# Patient Record
Sex: MALE | NOT HISPANIC OR LATINO | Employment: STUDENT | URBAN - METROPOLITAN AREA
[De-identification: names, ages, dates, MRNs, and addresses within clinical notes are randomized per-mention and may not be internally consistent; named-entity substitution may affect disease eponyms.]

---
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
## 2021-01-02 ENCOUNTER — HOSPITAL ENCOUNTER (EMERGENCY)
Facility: CLINIC | Age: 21
Discharge: HOME | End: 2021-01-02
Attending: EMERGENCY MEDICINE | Admitting: EMERGENCY MEDICINE
Payer: COMMERCIAL

## 2021-01-02 VITALS
WEIGHT: 143 LBS | HEART RATE: 105 BPM | RESPIRATION RATE: 18 BRPM | DIASTOLIC BLOOD PRESSURE: 82 MMHG | TEMPERATURE: 99.5 F | SYSTOLIC BLOOD PRESSURE: 127 MMHG | OXYGEN SATURATION: 99 %

## 2021-01-02 DIAGNOSIS — J02.9 ACUTE PHARYNGITIS, UNSPECIFIED ETIOLOGY: ICD-10-CM

## 2021-01-02 DIAGNOSIS — J11.1 INFLUENZA-LIKE ILLNESS: ICD-10-CM

## 2021-01-02 DIAGNOSIS — R51.9 NONINTRACTABLE HEADACHE, UNSPECIFIED CHRONICITY PATTERN, UNSPECIFIED HEADACHE TYPE: ICD-10-CM

## 2021-01-02 LAB
FLUABV+SARS-COV-2+RSV PNL RESP NAA+PROBE: NEGATIVE
FLUABV+SARS-COV-2+RSV PNL RESP NAA+PROBE: NEGATIVE
LABORATORY COMMENT REPORT: NORMAL
RSV RNA SPEC QL NAA+PROBE: NORMAL
SARS-COV-2 RNA SPEC QL NAA+PROBE: NEGATIVE
SPECIMEN SOURCE: NORMAL

## 2021-01-02 PROCEDURE — C9803 HOPD COVID-19 SPEC COLLECT: HCPCS

## 2021-01-02 PROCEDURE — 250N000009 HC RX 250: Performed by: EMERGENCY MEDICINE

## 2021-01-02 PROCEDURE — 250N000013 HC RX MED GY IP 250 OP 250 PS 637: Performed by: EMERGENCY MEDICINE

## 2021-01-02 PROCEDURE — 99283 EMERGENCY DEPT VISIT LOW MDM: CPT

## 2021-01-02 PROCEDURE — 87636 SARSCOV2 & INF A&B AMP PRB: CPT | Performed by: EMERGENCY MEDICINE

## 2021-01-02 RX ORDER — IBUPROFEN 600 MG/1
600 TABLET, FILM COATED ORAL ONCE
Status: COMPLETED | OUTPATIENT
Start: 2021-01-02 | End: 2021-01-02

## 2021-01-02 RX ORDER — DEXAMETHASONE SODIUM PHOSPHATE 10 MG/ML
10 INJECTION, SOLUTION INTRAMUSCULAR; INTRAVENOUS ONCE
Status: COMPLETED | OUTPATIENT
Start: 2021-01-02 | End: 2021-01-02

## 2021-01-02 RX ORDER — ACETAMINOPHEN 325 MG/1
650 TABLET ORAL ONCE
Status: COMPLETED | OUTPATIENT
Start: 2021-01-02 | End: 2021-01-02

## 2021-01-02 RX ADMIN — IBUPROFEN 600 MG: 600 TABLET, FILM COATED ORAL at 01:20

## 2021-01-02 RX ADMIN — DEXAMETHASONE SODIUM PHOSPHATE 10 MG: 10 INJECTION, SOLUTION INTRAMUSCULAR; INTRAVENOUS at 01:20

## 2021-01-02 RX ADMIN — ACETAMINOPHEN 650 MG: 325 TABLET, FILM COATED ORAL at 01:20

## 2021-01-02 ASSESSMENT — ENCOUNTER SYMPTOMS
VOMITING: 0
HEADACHES: 1
ABDOMINAL PAIN: 0
NUMBNESS: 0
TROUBLE SWALLOWING: 0
NAUSEA: 0
CHILLS: 1
WEAKNESS: 0
NECK STIFFNESS: 0
SORE THROAT: 1
SHORTNESS OF BREATH: 0
COUGH: 1
DIARRHEA: 0

## 2021-01-02 NOTE — DISCHARGE INSTRUCTIONS
Discharge Instructions  COVID-19    COVID-19 is the disease caused by a new coronavirus. The virus spreads from person-to-person primarily by droplets when an infected person coughs or sneezes and the droplet either lands on another person or that other person touches a surface with the droplet on it. There are tests available to diagnose COVID-19. There is no specific treatment or medicine for the disease.    You may have been diagnosed with COVID, may be being tested for COVID and have a pending test result, or may have been exposed to COVID.    Symptoms of COVID-19    Many people have no symptoms or mild symptoms.  Symptoms may usually appear 4 to 5 days (up to 14 days) after contact with a person with COVID-19. Some people will get severe symptoms and pneumonia. Usual symptoms are:     ? Fever  ? Cough  ? Trouble breathing    Less common symptoms are: Headache, body aches, sore throat, sneezing, diarrhea, loss of taste or smell.    Isolation and Quarantine    You were seen because you have symptoms, had an exposure, or had some other concern about possible COVID. The best way to stop the spread of the virus is to avoid contact with others.  Isolation refers to sick people staying away from people who are not sick. A person in quarantine is limiting activity because they were exposed and are waiting to see if they might become sick.    If you test positive for COVID, you should stay home (isolation) for at least 10 days after your symptoms began, and for 24 hours with no fever and improvement of symptoms--whichever is longer. (Your fever should be gone for 24 hours without using fever-reducing medicine). If you have no symptoms, you should stay home (isolation) for 10 days from the day of the test.    For example, if you have a fever and cough for 6 days, you need to stay home 4 more days with no fever for a total of 10 days. Or, if you have a fever and cough for 10 days, you need to stay home one more day with  no fever for a total of 11 days.    If you have a high-risk exposure to COVID (you spent 15 minutes or more within six feet of somebody who has COVID), you should stay home (quarantine) for 14 days. Even if you test negative for COVID, the CDC recommends a 14-day quarantine from the time of your last exposure to that individual. There are options for a shortened (<14 day quarantine) you can review at:    [email or web address]/close.html#long    If you have symptoms but a negative test, you should stay at home until you are symptom-free and without fever for 24 hours, using the same judgment you would for when it is safe to return to work/school from strep throat, influenza, or the common cold. If you worsen, you should consider being re-evaluated.    If you are being tested for COVID and your test is pending, you should stay home until you know your test result.    How should I protect myself and others?    Do not go to work or school. Have a friend or relative do your shopping. Do not use public transportation (bus, train) or ridesharing (Lyft, Uber).    Separate yourself from other people in your home. As much as possible, you should stay in one room and away from other people in your home. Also, use a separate bathroom, if possible. Avoid handling pets or other animals while sick.     Wear a facemask if you need to be around other people and cover your mouth and nose with a tissue when you cough or sneeze.     Avoid sharing personal household items. You should not share dishes, drinking glasses, forks/knives/spoons, towels, or bedding with other people in your home. After using these items, they should be washed with soap and water. Clean parts of your home that are touched often (doorknobs, faucets, countertops, etc.) daily.     Wash your hands often with soap and water for at least 20 seconds or use an alcohol-based hand  containing at least 60% alcohol.     Avoid touching  your face.    Treat your symptoms. You can take Acetaminophen (Tylenol) to treat body aches and fever as needed for comfort. Ibuprofen (Advil or Motrin) can be used as well if you still have symptoms after taking Tylenol. Drink fluids. Rest.    Watch for worsening symptoms such as shortness of breath/difficulty breathing or very severe weakness.    Employers/workplaces are being asked by the Centers for Disease Control (CDC) to not request notes/documentation for you to return to work or prove that you were ill. You may choose to show your employer this paperwork. Also, repeat testing should not be required to return to work.    Return to the Emergency Department if:    If you are developing worsening breathing, shortness of breath, or feel worse you should seek medical attention.  If you are uncertain, contact your health care provider/clinic. If you need emergency medical attention, call 911 and tell them you have been ill.

## 2021-01-02 NOTE — LETTER
January 2, 2021      To Whom It May Concern:      Sudarshan Izaguirre was seen in our Emergency Department today, 01/02/21.  I expect his condition to improve over the next few days.  He may return to work Monday, 1/4/2020.         Sincerely,        Deisy Yen RN

## 2021-01-02 NOTE — ED TRIAGE NOTES
"Patient is reporting \"all the symptoms of the flu\". He recently returned from a trip to [place].   "

## 2021-01-02 NOTE — ED PROVIDER NOTES
History   Chief Complaint:  Suspected Covid     HPI   Sudarshan Izaguirre is a 20 year old male who is otherwise healthy presents to the ER for headache, sore throat, chills, nasal congestion, rare dry cough.  Patient returned from a trip to [place] this afternoon.  Sore throat started this morning.  He is still able to swallow.  He denies neck stiffness.  He denies vision changes or numbness or weakness in extremities or nausea or vomiting or diarrhea or abdominal pain.  He denies chest pain or shortness of breath.  He has not taken any medicines for his symptoms.    Review of Systems   Constitutional: Positive for chills.   HENT: Positive for congestion and sore throat. Negative for trouble swallowing.    Eyes: Negative for visual disturbance.   Respiratory: Positive for cough. Negative for shortness of breath.    Cardiovascular: Negative for chest pain.   Gastrointestinal: Negative for abdominal pain, diarrhea, nausea and vomiting.   Musculoskeletal: Negative for neck stiffness.   Neurological: Positive for headaches. Negative for weakness and numbness.   All other systems reviewed and are negative.    Allergies:  No Known Allergies    Medications:  None    Past Medical History:    Asthma    Social History:  Presents alone  Recently returned from [place]    Physical Exam     Patient Vitals for the past 24 hrs:   BP Temp Pulse Resp SpO2 Weight   01/02/21 0046 127/82 99.5  F (37.5  C) 105 18 99 % 64.9 kg (143 lb)       Physical Exam  VS: Reviewed per above  HENT: Mucous membranes moist. Uvula midline, no tonsillar hypertrophy nor asymmetry. Tolerating secretions, normal phonation. No nuchal rigidity.  EYES: sclera anicteric  CV: Rate as noted, regular rhythm.   RESP: Effort normal. Breath sounds are normal bilaterally.  NEURO: Alert, moving all extremities equally  MSK: No deformity of the extremities  SKIN: Warm and dry      Emergency Department Course     Laboratory:  Symptomatic COVID-19 Virus (Coronavirus) by  PCR: Pending    Emergency Department Course:    Reviewed:  I reviewed nursing notes, vitals, past medical history and care everywhere    Assessments:  0049: I obtained history from the patient and performed a physical exam. I discussed findings and plan at this time.     Interventions:  0120: Decadron 10 mg PO   0120: Tylenol 650 mg PO   0120: Ibuprofen 600 mg PO      Disposition:  The patient was discharged to home.     Impression & Plan   Covid-19  Sudarshan Izaguirre was evaluated during a global COVID-19 pandemic, which necessitated consideration that the patient might be at risk for infection with the SARS-CoV-2 virus that causes COVID-19.   Applicable protocols for evaluation were followed during the patient's care.   COVID-19 was considered as part of the patient's evaluation. The plan for testing is:  a test was obtained during this visit.    Medical Decision Making:  Sudarshan Izaguirre is a 20 year old male who presents for evaluation of headache, chills, sore throat, nasal congestion, rare dry cough.  Vitals signs reveal normal SPO2 on room air, heart rate of 105, stable blood pressure, temperature of 99.5  F. Constellation of sx is consistent with an influenza like illness vs URI but upon initial evaluation, there is also concern for possible SARS-CoV2 infection. COVID19 testing was sent.  At this point in time, patient is well appearing and there are no historical or exam findings that merit hospital admission or further workup.  I have low suspicion for processes such as meningitis, peritonsillar abscess, retropharyngeal abscess, epiglottitis, pneumonia.  He was given a dose of Decadron here for his pharyngitis and Tylenol ibuprofen for his headache.  I encouraged tylenol/ibuprofen at home and lots of fluids and rest. Discussed self isolation. Discussed return precautions including shortness of breath, neck stiffness, trouble swallowing, blood in sputum, unable to tolerate liquids or new concerns.  Encouraged follow up with in primary care clinic as they are able as well.        Diagnosis:    ICD-10-CM    1. Influenza-like illness  J11.1    2. Nonintractable headache, unspecified chronicity pattern, unspecified headache type  R51.9    3. Acute pharyngitis, unspecified etiology  J02.9        Discharge Medications:  New Prescriptions    No medications on file       Scribe Disclosure:  I, Aiyana Laurent, am serving as a scribe at 12:44 AM on 1/2/2021 to document services personally performed by Hira Leone MD based on my observations and the provider's statements to me.         Hira Leone MD  01/02/21 0151

## 2021-01-02 NOTE — ED AVS SNAPSHOT
[facility] Emergency Dept  [address] [place] [facility] [address]  Phone: [phone]  Fax: [phone]                                    Sudarshan Izaguirre   MRN: [id number]    Department: [facility] Emergency Dept   Date of Visit: 1/2/2021           After Visit Summary Signature Page    I have received my discharge instructions, and my questions have been answered. I have discussed any challenges I see with this plan with the nurse or doctor.    ..........................................................................................................................................  Patient/Patient Representative Signature      ..........................................................................................................................................  Patient Representative Print Name and Relationship to Patient    ..................................................               ................................................  Date                                   Time    ..........................................................................................................................................  Reviewed by Signature/Title    ...................................................              ..............................................  Date                                               Time          22EPIC Rev 08/18

## 2021-01-03 ENCOUNTER — TELEPHONE (OUTPATIENT)
Dept: EMERGENCY MEDICINE | Facility: CLINIC | Age: 21
End: 2021-01-03

## 2021-01-03 NOTE — TELEPHONE ENCOUNTER

## 2021-05-18 ENCOUNTER — HOSPITAL ENCOUNTER (EMERGENCY)
Facility: CLINIC | Age: 21
End: 2021-05-18
Payer: COMMERCIAL

## 2022-09-22 ENCOUNTER — HOSPITAL ENCOUNTER (EMERGENCY)
Facility: CLINIC | Age: 22
Discharge: HOME | End: 2022-09-22
Attending: EMERGENCY MEDICINE | Admitting: EMERGENCY MEDICINE
Payer: COMMERCIAL

## 2022-09-22 VITALS
DIASTOLIC BLOOD PRESSURE: 60 MMHG | TEMPERATURE: 98.3 F | BODY MASS INDEX: 25.18 KG/M2 | OXYGEN SATURATION: 100 % | HEART RATE: 73 BPM | RESPIRATION RATE: 20 BRPM | WEIGHT: 170 LBS | HEIGHT: 69 IN | SYSTOLIC BLOOD PRESSURE: 120 MMHG

## 2022-09-22 DIAGNOSIS — R35.0 URINARY FREQUENCY: ICD-10-CM

## 2022-09-22 DIAGNOSIS — J02.0 STREPTOCOCCAL PHARYNGITIS: ICD-10-CM

## 2022-09-22 LAB
ALBUMIN UR-MCNC: NEGATIVE MG/DL
APPEARANCE UR: CLEAR
BILIRUB UR QL STRIP: NEGATIVE
COLOR UR AUTO: NORMAL
DEPRECATED S PYO AG THROAT QL EIA: POSITIVE
FLUAV RNA SPEC QL NAA+PROBE: NEGATIVE
FLUBV RNA RESP QL NAA+PROBE: NEGATIVE
GLUCOSE BLDC GLUCOMTR-MCNC: 104 MG/DL (ref 70–99)
GLUCOSE UR STRIP-MCNC: NEGATIVE MG/DL
HGB UR QL STRIP: NEGATIVE
KETONES UR STRIP-MCNC: NEGATIVE MG/DL
LEUKOCYTE ESTERASE UR QL STRIP: NEGATIVE
NITRATE UR QL: NEGATIVE
PH UR STRIP: 5.5 [PH] (ref 5–7)
RBC URINE: 1 /HPF
RSV RNA SPEC NAA+PROBE: NEGATIVE
SARS-COV-2 RNA RESP QL NAA+PROBE: NEGATIVE
SP GR UR STRIP: 1.02 (ref 1–1.03)
SQUAMOUS EPITHELIAL: <1 /HPF
UROBILINOGEN UR STRIP-MCNC: NORMAL MG/DL
WBC URINE: <1 /HPF

## 2022-09-22 PROCEDURE — C9803 HOPD COVID-19 SPEC COLLECT: HCPCS

## 2022-09-22 PROCEDURE — 99283 EMERGENCY DEPT VISIT LOW MDM: CPT | Mod: CS

## 2022-09-22 PROCEDURE — 87880 STREP A ASSAY W/OPTIC: CPT | Performed by: EMERGENCY MEDICINE

## 2022-09-22 PROCEDURE — 87637 SARSCOV2&INF A&B&RSV AMP PRB: CPT | Performed by: EMERGENCY MEDICINE

## 2022-09-22 PROCEDURE — 81001 URINALYSIS AUTO W/SCOPE: CPT | Performed by: EMERGENCY MEDICINE

## 2022-09-22 RX ORDER — AMOXICILLIN 500 MG/1
500 CAPSULE ORAL 2 TIMES DAILY
Qty: 20 CAPSULE | Refills: 0 | Status: SHIPPED | OUTPATIENT
Start: 2022-09-22 | End: 2022-10-02

## 2022-09-22 RX ORDER — OXYMETAZOLINE HYDROCHLORIDE 0.05 G/100ML
2 SPRAY NASAL 2 TIMES DAILY
Qty: 1 ML | Refills: 0 | Status: SHIPPED | OUTPATIENT
Start: 2022-09-22 | End: 2022-09-25

## 2022-09-22 RX ORDER — FLUTICASONE PROPIONATE 50 MCG
1 SPRAY, SUSPENSION (ML) NASAL DAILY
Qty: 9.9 ML | Refills: 0 | Status: SHIPPED | OUTPATIENT
Start: 2022-09-22

## 2022-09-22 ASSESSMENT — ACTIVITIES OF DAILY LIVING (ADL): ADLS_ACUITY_SCORE: 33

## 2022-09-22 NOTE — ED PROVIDER NOTES
"  History     Chief Complaint:  Pharyngitis       HPI   Sudarshan Izaguirre is a 22 year old male who presents with sore throat.  Sore throat began yesterday.  He has painful swallowing.  He also has a lot of nasal congestion.  No fevers or cough.  He has had a headache.  He feels fatigued.  He had a difficult time sleeping secondary to throat pain.  He has had strep in the past.  He has some nausea but no vomiting.  No diarrhea.  Also has noted that has been urinating frequently without pain.  He has no concerns about sexually transmitted infections..    ROS:  Review of Systems   All other systems reviewed and are negative.      Allergies:  No Known Allergies     Medications:    None    Past Medical History:    No past medical history on file.    Past Surgical History:    No past surgical history on file.     Family History:    family history is not on file.    Social History:   reports that he has never smoked. He has never used smokeless tobacco.  PCP: Rickie, [facility]     Physical Exam   Patient Vitals for the past 24 hrs:   BP Temp Temp src Pulse Resp SpO2 Height Weight   09/22/22 0423 120/60 98.3  F (36.8  C) Oral 73 20 100 % 1.753 m (5' 9\") 77.1 kg (170 lb)        Physical Exam  Eyes:  The pupils are equal and round    Conjunctivae and sclerae are normal  ENT:    The nose is normal    Pinnae are normal    The oropharynx has mild posterior erythema without evidence of PTA  Neck:  Normal range of motion    There is no rigidity noted    Trachea is in the midline  CV:  Regular rate and rhythm     No edema  Resp:  Normal respiratory effort. Speaks in full sentences   MS:  Normal muscular tone    No asymmetric leg swelling  Skin:  No rash or acute skin lesions noted  Neuro:   Awake, alert.      Speech is normal and fluent.    Face is symmetric.     Moves all extremities  Lymph: No adenopathy in neck    Emergency Department Course     Laboratory:  Labs Ordered and Resulted from Time of ED Arrival to " Time of ED Departure   STREPTOCOCCUS A RAPID SCREEN W REFELX TO PCR - Abnormal       Result Value    Group A Strep antigen Positive (*)    INFLUENZA A/B & SARS-COV2 PCR MULTIPLEX - Normal    Influenza A PCR Negative      Influenza B PCR Negative      RSV PCR Negative      SARS CoV2 PCR Negative     ROUTINE UA WITH MICROSCOPIC REFLEX TO CULTURE        Emergency Department Course:    Reviewed:  I reviewed nursing notes and vitals    Interventions:  Medications - No data to display     Disposition:  The patient was discharged to home.     Impression & Plan      Medical Decision Making:  Sudarshan Izaguirre is a 22 year old male who presents to the emergency department with a sore throat.  Rapid strep is positive.  COVID and flu testing are negative.  No signs of peritonsillar abscess.  She was also having urinary frequency.  No adenopathy in the neck.  We will plan for amoxicillin.  Is also having a lot of nasal congestion and she is given prescription for Flonase to Afrin.  Also notes urinary frequency without any pain.  Urinalysis reveals no signs of infection, proteinuria, or glucosuria.  Blood sugar was normal..  Plan for follow-up with PCP as needed.  Return with any new or worrisome symptoms.  Work note provided.      Diagnosis:    ICD-10-CM    1. Streptococcal pharyngitis  J02.0    2. Urinary frequency  R35.0         Discharge Medications:  New Prescriptions    AMOXICILLIN (AMOXIL) 500 MG CAPSULE    Take 1 capsule (500 mg) by mouth 2 times daily for 10 days    FLUTICASONE (FLONASE) 50 MCG/ACT NASAL SPRAY    Spray 1 spray into both nostrils daily    OXYMETAZOLINE (AFRIN NASAL SPRAY) 0.05 % NASAL SPRAY    Spray 2 sprays in nostril 2 times daily for 3 days        9/22/2022   Hansel Ugarte MD [place], Aaron Joseph, MD  09/22/22 5552

## 2022-09-22 NOTE — LETTER
September 22, 2022      To Whom It May Concern:      Sudarshan Izaguirre was seen in our Emergency Department today, 09/22/22.  I expect his condition to improve over the next 1-2 days.  He may return to work on or about 9/23/2022.    Sincerely,        Hasnel Ugarte MD

## 2022-09-22 NOTE — ED TRIAGE NOTES
Patient here with a sore throat and headache since yesterday. He denies having a cough and no fever

## 2022-11-21 ENCOUNTER — HOSPITAL ENCOUNTER (EMERGENCY)
Facility: CLINIC | Age: 22
Discharge: HOME | End: 2022-11-21
Attending: EMERGENCY MEDICINE | Admitting: EMERGENCY MEDICINE
Payer: COMMERCIAL

## 2022-11-21 VITALS
HEART RATE: 86 BPM | WEIGHT: 175 LBS | DIASTOLIC BLOOD PRESSURE: 80 MMHG | HEIGHT: 69 IN | RESPIRATION RATE: 20 BRPM | SYSTOLIC BLOOD PRESSURE: 130 MMHG | TEMPERATURE: 98.2 F | OXYGEN SATURATION: 99 % | BODY MASS INDEX: 25.92 KG/M2

## 2022-11-21 DIAGNOSIS — J02.9 ACUTE PHARYNGITIS, UNSPECIFIED ETIOLOGY: ICD-10-CM

## 2022-11-21 LAB — DEPRECATED S PYO AG THROAT QL EIA: NEGATIVE

## 2022-11-21 PROCEDURE — 96374 THER/PROPH/DIAG INJ IV PUSH: CPT

## 2022-11-21 PROCEDURE — 87651 STREP A DNA AMP PROBE: CPT | Performed by: EMERGENCY MEDICINE

## 2022-11-21 PROCEDURE — 250N000011 HC RX IP 250 OP 636: Performed by: EMERGENCY MEDICINE

## 2022-11-21 PROCEDURE — 99284 EMERGENCY DEPT VISIT MOD MDM: CPT | Mod: 25

## 2022-11-21 RX ORDER — DEXAMETHASONE SODIUM PHOSPHATE 4 MG/ML
10 INJECTION, SOLUTION INTRA-ARTICULAR; INTRALESIONAL; INTRAMUSCULAR; INTRAVENOUS; SOFT TISSUE ONCE
Status: COMPLETED | OUTPATIENT
Start: 2022-11-21 | End: 2022-11-21

## 2022-11-21 RX ADMIN — DEXAMETHASONE SODIUM PHOSPHATE 10 MG: 4 INJECTION, SOLUTION INTRAMUSCULAR; INTRAVENOUS at 23:26

## 2022-11-21 ASSESSMENT — ENCOUNTER SYMPTOMS
ABDOMINAL PAIN: 0
FEVER: 0
SORE THROAT: 1
RHINORRHEA: 1
VOMITING: 0
NAUSEA: 0

## 2022-11-22 LAB — GROUP A STREP BY PCR: NOT DETECTED

## 2022-11-22 NOTE — ED PROVIDER NOTES
"    History   Chief Complaint:  Pharyngitis       HPI   Sudarshan Izaguirre is a 22 year old male who presents with a sore throat. He states he had onset of a sore throat starting today at 1900. The patient reports that he has also had some rhinorrhea. He denies any fever, adominal pain, nausea, and vomiting.  He is concerned for strep pharyngitis.    Review of Systems   Constitutional: Negative for fever.   HENT: Positive for rhinorrhea and sore throat.    Gastrointestinal: Negative for abdominal pain, nausea and vomiting.   All other systems reviewed and are negative.      Allergies:  No Known Drug Allergies    Medications:    Medications reviewed. No current medications.     Past Medical History:    Medical history reviewed. No pertinent medical history.    Past Surgical History:    Surgical history reviewed. No pertinent surgical history.    Family History:    Family history reviewed. No pertinent family history.      Social History:  The patient presents to the ED alone.   PCP: [facility], [facility]     Physical Exam     Patient Vitals for the past 24 hrs:   BP Temp Temp src Pulse Resp SpO2 Height Weight   11/21/22 2250 130/80 98.2  F (36.8  C) Oral 86 20 99 % 1.753 m (5' 9\") 79.4 kg (175 lb)       Physical Exam  Nursing note and vitals reviewed.  Constitutional:  Oriented to person, place, and time. Cooperative.   HENT:   Nose:    Nose normal.   Mouth/Throat:   Erythema to the posterior oropharynx but no exudate or asymmetry.   Eyes:    Conjunctivae normal and EOM are normal.      Pupils are equal, round, and reactive to light.   Neck:    Trachea normal.   Cardiovascular:  Normal rate, regular rhythm, normal heart sounds and normal pulses. No murmur heard.  Pulmonary/Chest:  Effort normal and breath sounds normal.   Abdominal:   Soft. Normal appearance and bowel sounds are normal.      There is no tenderness.      There is no rebound and no CVA tenderness.   Musculoskeletal:  Extremities atraumatic " x 4.   Lymphadenopathy:  No cervical adenopathy.   Neurological:   Alert and oriented to person, place, and time. Normal strength.      No cranial nerve deficit or sensory deficit. GCS eye subscore is 4. GCS verbal subscore is 5. GCS motor subscore is 6.   Skin:    Skin is intact. No rash noted.   Psychiatric:   Normal mood and affect.    Emergency Department Course     Laboratory:  Labs Ordered and Resulted from Time of ED Arrival to Time of ED Departure   STREPTOCOCCUS A RAPID SCREEN W REFELX TO PCR - Normal       Result Value    Group A Strep antigen Negative     GROUP A STREPTOCOCCUS PCR THROAT SWAB            Emergency Department Course:    Reviewed:  I reviewed nursing notes, vitals and past medical history    Assessments:  2249 I obtained history and examined the patient as noted above.      I rechecked the patient and explained findings.     Disposition:  The patient was discharged to home.     Impression & Plan     Medical Decision Making:  This is a 22-year-old male came in for further evaluation of a sore throat, which started a few hours prior to arrival.  He really has no other symptoms to suggest influenza or COVID.  His main concern was strep pharyngitis, and his strep test is negative.  Here they are culturing this as well and that someone will get in touch with him if it comes back positive for strep.  He was provided an oral dose of dexamethasone here.  I suspect this is likely something viral, and I recommended rest as well as Tylenol and ibuprofen and fluids.  He should return with any concerns or worsening symptoms and follow-up if he is not improving in a timely fashion.      Diagnosis:    ICD-10-CM    1. Acute pharyngitis, unspecified etiology  J02.9           Discharge Medications:  New Prescriptions    No medications on file       Scribe Disclosure:  I, Adore Nuñez, am serving as a scribe at 10:53 PM on 11/21/2022 to document services personally performed by Joaquin Vaughan MD based on  my observations and the provider's statements to me.               Joaquin Vaughan MD  11/21/22 4244

## 2022-11-22 NOTE — ED TRIAGE NOTES
Patient here with a sore throat which started today. He denies having a cough and no fever     Triage Assessment     Row Name 11/21/22 1734       Triage Assessment (Adult)    Airway WDL WDL       Respiratory WDL    Respiratory WDL WDL       Skin Circulation/Temperature WDL    Skin Circulation/Temperature WDL WDL       Cardiac WDL    Cardiac WDL WDL       Peripheral/Neurovascular WDL    Peripheral Neurovascular WDL WDL       Cognitive/Neuro/Behavioral WDL    Cognitive/Neuro/Behavioral WDL WDL

## 2024-05-18 ENCOUNTER — ANCILLARY PROCEDURE (OUTPATIENT)
Dept: GENERAL RADIOLOGY | Facility: CLINIC | Age: 24
End: 2024-05-18
Attending: FAMILY MEDICINE
Payer: COMMERCIAL

## 2024-05-18 ENCOUNTER — OFFICE VISIT (OUTPATIENT)
Dept: FAMILY MEDICINE | Facility: CLINIC | Age: 24
End: 2024-05-18
Payer: COMMERCIAL

## 2024-05-18 VITALS
HEART RATE: 89 BPM | WEIGHT: 164 LBS | SYSTOLIC BLOOD PRESSURE: 117 MMHG | DIASTOLIC BLOOD PRESSURE: 54 MMHG | TEMPERATURE: 97.9 F | OXYGEN SATURATION: 99 % | BODY MASS INDEX: 24.22 KG/M2

## 2024-05-18 DIAGNOSIS — G89.29 CHRONIC PAIN OF LEFT KNEE: Primary | ICD-10-CM

## 2024-05-18 DIAGNOSIS — G89.29 CHRONIC PAIN OF LEFT KNEE: ICD-10-CM

## 2024-05-18 DIAGNOSIS — M25.562 CHRONIC PAIN OF LEFT KNEE: Primary | ICD-10-CM

## 2024-05-18 DIAGNOSIS — M25.562 CHRONIC PAIN OF LEFT KNEE: ICD-10-CM

## 2024-05-18 PROCEDURE — 99203 OFFICE O/P NEW LOW 30 MIN: CPT

## 2024-05-18 PROCEDURE — 73562 X-RAY EXAM OF KNEE 3: CPT | Mod: TC | Performed by: RADIOLOGY

## 2024-05-18 RX ORDER — LEVOTHYROXINE SODIUM 137 UG/1
TABLET ORAL
COMMUNITY
Start: 2024-02-20

## 2024-05-18 NOTE — PROGRESS NOTES
Assessment & Plan     Chronic pain of left knee  Xray wnl  2 month of knee pain after a fall onto knee  Refer to sports medicine for dx/treatment.  - XR Knee Left 3 Views             No follow-ups on file.    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  Ice and accupuncture.  Able to lift weights and do leg exercise        Review of Systems        Objective    There were no vitals taken for this visit.  Physical Exam  Vitals and nursing note reviewed.   Constitutional:       Appearance: Normal appearance.   Musculoskeletal:         General: Tenderness present. Normal range of motion.      Comments: Anterior knee with tenderness to palpation over the prox tibia area.  No effusion  ROM wnl  Low ext strength/sensation intact   Neurological:      Mental Status: He is alert.

## 2024-05-18 NOTE — PATIENT INSTRUCTIONS
I will refer you to sports medicine for further evaluation and treatment. Xray today looks okay. They should get you in very soon. I don't see any changes like seen in [facility].  In meantime ice 20-30 minutes 2-3x/day.     You could try some ibuprofen 400mg before bed if interfering with sleep.

## 2024-06-16 ENCOUNTER — HEALTH MAINTENANCE LETTER (OUTPATIENT)
Age: 24
End: 2024-06-16

## 2024-07-17 NOTE — PROGRESS NOTES
CHIEF COMPLAINT:  No chief complaint on file.       HISTORY OF PRESENT ILLNESS  Mr. Izaguirre is a pleasant 23 year old year old male who presents to clinic today with left knee pain.  Sudarshan explains that in March, he fell and hit his knee on a metal bar.  He states he hasn't been taking care of it much, hasn't been resting and will occasionally bump it.  X-ray on 5/18, and pain has gotten worse since then.    He has been able to perform most exercises on the leg today, however he does have pain with more dynamic exercises such as squat jumps, side lunges.    Onset: sudden  Location: left knee anterior  Quality:  sharp  Duration: 1 years   Severity: 8/10 at worst  Timing:intermittent episodes with increased activity  Modifying factors:  resting and non-use makes it better, movement and use makes it worse, having knee flexed for long periods  Associated signs & symptoms: pain and tenderness  Previous similar pain: No  Treatments to date: ice, NSAIDS    Additional history: as documented    Review of Systems: A 10-point review of systems was obtained and is negative except for as noted in the HPI.     MEDICAL HISTORY  There is no problem list on file for this patient.      Current Outpatient Medications   Medication Sig Dispense Refill    fluticasone (FLONASE) 50 MCG/ACT nasal spray Spray 1 spray into both nostrils daily (Patient not taking: Reported on 5/18/2024) 9.9 mL 0    levothyroxine (SYNTHROID/LEVOTHROID) 137 MCG tablet          No Known Allergies    No family history on file.    Additional medical/Social/Surgical histories reviewed in [facility] and updated as appropriate.    PHYSICAL EXAM  There were no vitals taken for this visit.    General  - normal appearance, in no obvious distress  Musculoskeletal - left knee  - stance: normal gait without limp  - inspection: no swelling or effusion  - palpation: no joint line tenderness, normal popliteal pulse, tender through entirety of the patellar tendon, tender prepatellar  bursal region.  - ROM: 135 degrees flexion, -5 degrees extension, painful passive flexion terminally  - strength: 5/5 in flexion, 5/5 in extension  - special tests:  (-) Lachman  (-) anterior drawer  (-) Justin  (-) Thessaly  (-) varus at 0 and 30 degrees flexion  (-) valgus at 0 and 30 degrees flexion  Neuro  - no sensory or motor deficit, grossly normal coordination, normal muscle tone      IMAGING : XR left knee 3 views. Final results and radiologist's interpretation, available in the [facility] health record. Images were reviewed with the patient/family members in the office today. My personal interpretation of the performed imaging is no acute osseous abnormality or significant degenerative changes of joint.     ASSESSMENT & PLAN  Mr. Izaguirre is a 23 year old year old male who presents to clinic today with chronic left anterior knee pain that seem to begin around an injury last year striking his knee on a metal bar while at a concert venue.    Clinical examination as well as reassuring x-rays today consistent with patellar tendinitis of left knee.  I do believe there is also a component of prepatellar bursitis that is chronic.    Diagnosis: Patellar tendinitis of left knee, chronic pain of left knee    I recommended starting patellar tendon home exercise program.  Discussed a patellar Cho-Pat strap or knee sleeve.  We did fit him for a neoprene sleeve today as he preferred this.  Physical therapy recommended given chronicity and I would like to see him back in 6 to 8 weeks.  Avoid provocative activities in the gym.  If no improvement after this time, I would then recommend considering MRI of his left knee.    It was a pleasure seeing Sudarshan today.    Angus Botello DO, [facility]  Primary Care Sports Medicine

## 2024-07-18 ENCOUNTER — OFFICE VISIT (OUTPATIENT)
Dept: ORTHOPEDICS | Facility: CLINIC | Age: 24
End: 2024-07-18
Attending: FAMILY MEDICINE
Payer: COMMERCIAL

## 2024-07-18 VITALS
HEIGHT: 69 IN | WEIGHT: 164 LBS | SYSTOLIC BLOOD PRESSURE: 112 MMHG | DIASTOLIC BLOOD PRESSURE: 72 MMHG | BODY MASS INDEX: 24.29 KG/M2

## 2024-07-18 DIAGNOSIS — M76.52 PATELLAR TENDINITIS, LEFT KNEE: Primary | ICD-10-CM

## 2024-07-18 DIAGNOSIS — M25.562 CHRONIC PAIN OF LEFT KNEE: ICD-10-CM

## 2024-07-18 DIAGNOSIS — G89.29 CHRONIC PAIN OF LEFT KNEE: ICD-10-CM

## 2024-07-18 PROCEDURE — 99203 OFFICE O/P NEW LOW 30 MIN: CPT | Performed by: FAMILY MEDICINE

## 2024-07-18 NOTE — PATIENT INSTRUCTIONS
Thank you for choosing [facility]    DR MENA'S CLINIC LOCATIONS  [place] [address] [address] [facility], 4th [place]   [place], [place], [address] [place] [address]   [phone]       APPOINTMENTS: [phone]    CARE QUESTIONS: [phone],    BILLING QUESTIONS: [phone]    FAX NUMBER: [phone]            1. Chronic pain of left knee          Physical Therapy orders have been placed with [facility] ([facility])  You can call [phone] to schedule at your convenience.            Patellar sleeve  PT  Voltaren gel  Home exercises.      Patellar Tendonitis    WHAT IS JUMPER S KNEE?    Jumper s knee is a problem with the tendon that connects your kneecap to your shinbone. Tendons are strong bands of tissue that connect muscle to bone. They can be injured suddenly or they may be slowly damaged over time. You can have tiny or partial tears in your tendon. If you have a complete tear of your tendon, it is called a rupture. Other tendon injuries may be called a strain, tendinosis, or tendonitis. Jumper's knee is also called a patellar tendon injury, or patellar tendinopathy.    WHAT IS THE CAUSE?    Jumper's knee can be caused by:    Overuse of the tendon from a sport or work activity that involves your knees, such as too much jumping, running, walking, or bicycling  A sudden activity that twists or tears your tendon, such as a fall or an accident  Jumper s knee can also happen if your hips, legs, knees, or feet are not aligned properly. People whose hips are wide, who are knock-kneed, or who have feet with arches that collapse when they walk or run can have this problem.    WHAT ARE THE SYMPTOMS?    Symptoms may include:    Pain and tenderness around your knee and behind your kneecap  Pain when you bend or straighten your leg  Pain when you jump, run, or walk, especially  downhill or down stairs  Swelling in your knee joint or where your tendon attaches to your shinbone  If your tendon is torn, usually you will have sudden severe pain and you will not be able to straighten your leg or walk.    HOW IS IT DIAGNOSED?    Your healthcare provider will examine you and ask about your symptoms, activities, and medical history. You may have X-rays or other scans.    HOW IS IT TREATED?    You will need to change or stop doing the activities that cause pain until the tendon has healed. For example, swim instead of run.    You may need to wear a special strap that goes over your patellar tendon or a knee brace that helps support and protect your knee while your tendon heals. Special shoes or shoe inserts may also help.    Your healthcare provider may recommend stretching and strengthening exercises to help you heal.    If your tendon is torn, you may need surgery to repair the tendon.    The pain often gets better within a few weeks with self-care, but some injuries may take several months or longer to heal. It s important to follow all of your healthcare provider s instructions.    HOW CAN I TAKE CARE OF MYSELF?    To help the swelling and pain:    Put an ice pack, gel pack, or package of frozen vegetables wrapped in a cloth, on the area every 3 to 4 hours for up to 20 minutes at a time.  Keep your knee up on a pillow when you sit or lie down.  Take pain medicine, such as acetaminophen, ibuprofen, or other medicine as directed by your provider. Nonsteroidal anti-inflammatory medicines (NSAIDs), such as ibuprofen, may cause stomach bleeding and other problems. These risks increase with age. Read the label and take as directed. Unless recommended by your healthcare provider, do not take for more than 10 days.  Follow your healthcare provider's instructions, including any exercises recommended by your provider. Ask your provider:    How and when you will hear your test results  How long it will  take to recover  What activities you should avoid, including how much you can lift, and when you can return to your normal activities  How to take care of yourself at home  What symptoms or problems you should watch for and what to do if you have them  Make sure you know when you should come back for a checkup.    HOW CAN I HELP PREVENT JUMPER S KNEE?    Warm-up exercises and stretching before activities can help prevent injuries. For example, do stretches and exercises that strengthen your thigh muscles. If your knee hurts after exercise, putting ice on it may help keep it from getting injured.    Follow the safety rules for your work or sport and use protective equipment, like wearing the right type of shoes for your activities.    Developed by [employer].  Published by [employer].  Copyright  2014 [employer] and/or one of its subsidiaries. All rights reserved.    References    Patellar Tendonitis Exercises    You can do the first 4 exercises right away. When you have less pain in your knee, you can do the remaining exercises.    Standing hamstring stretch: Put the heel of the leg on your injured side on a stool about 15 inches high. Keep your leg straight. Lean forward, bending at the hips, until you feel a mild stretch in the back of your thigh. Make sure you don't roll your shoulders or bend at the waist when doing this or you will stretch your lower back instead of your leg. Hold the stretch for 15 to 30 seconds. Repeat 3 times.    Quadriceps stretch: Stand at an arm's length away from the wall with your injured side farthest from the wall. Facing straight ahead, brace yourself by keeping one hand against the wall. With your other hand, grasp the ankle on your injured side and pull your heel toward your buttocks. Don't arch or twist your back. Keep your knees together. Hold this stretch for 15 to 30 seconds.    Side-lying leg lift: Lie on your uninjured side. Tighten the front thigh muscles on your  injured leg and lift that leg 8 to 10 inches (20 to 25 centimeters) away from the other leg. Keep the leg straight and lower it slowly. Do 2 sets of 15.  Rectus femoris stretch: Kneel on your injured knee on a padded surface. Place your other leg in front of you with your foot flat on the floor. Keep your head and chest facing forward and upright and grab the ankle behind you. Gently bring your ankle back toward your buttocks until you feel a stretch in the front of your thigh. Hold 15 to 30 seconds. Repeat 2 to 3 times.    Straight leg raise: Lie on your back with your legs straight out in front of you. Bend the knee on your uninjured side and place the foot flat on the floor. Tighten the thigh muscle on your injured side and lift your leg about 8 inches off the floor. Keep your leg straight and your thigh muscle tight. Slowly lower your leg back down to the floor. Do 2 sets of 15.    Prone hip extension: Lie on your stomach with your legs straight out behind you. Fold your arms under your head and rest your head on your arms. Draw your belly button in towards your spine and tighten your abdominal muscles. Tighten the buttocks and thigh muscles of the leg on your injured side and lift the leg off the floor about 8 inches. Keep your leg straight. Hold for 5 seconds. Then lower your leg and relax. Do 2 sets of 15.    Clam exercise: Lie on your uninjured side with your hips and knees bent and feet together. Slowly raise your top leg toward the ceiling while keeping your heels touching each other. Hold for 2 seconds and lower slowly. Do 2 sets of 15 repetitions.    Step-up: Stand with the foot of your injured leg on a support 3 to 5 inches (8 to 13 centimeters) high --like a small step or block of wood. Keep your other foot flat on the floor. Shift your weight onto the injured leg on the support. Straighten your injured leg as the other leg comes off the floor. Return to the starting position by bending your injured  leg and slowly lowering your uninjured leg back to the floor. Do 2 sets of 15.    Wall squat with a ball: Stand with your back, shoulders, and head against a wall. Look straight ahead. Keep your shoulders relaxed and your feet 3 feet (90 centimeters) from the wall and shoulder's width apart. Place a soccer or basketball-sized ball behind your back. Keeping your back against the wall, slowly squat down to a 45-degree angle. Your thighs will not yet be parallel to the floor. Hold this position for 10 seconds and then slowly slide back up the wall. Repeat 10 times. Build up to 2 sets of 15.    Knee stabilization: Wrap a piece of elastic tubing around the ankle of your uninjured leg. Tie a knot in the other end of the tubing and close it in a door at about ankle height.    Stand facing the door on the leg without tubing (your injured leg) and bend your knee slightly, keeping your thigh muscles tight. Stay in this position while you move the leg with the tubing (the uninjured leg) straight back behind you. Do 2 sets of 15.  Turn 90 degrees so the leg without tubing is closest to the door. Move the leg with tubing away from your body. Do 2 sets of 15.  Turn 90 degrees again so your back is to the door. Move the leg with tubing straight out in front of you. Do 2 sets of 15.  Turn your body 90 degrees again so the leg with tubing is closest to the door. Move the leg with tubing across your body. Do 2 sets of 15.  Hold onto a chair if you need help balancing. This exercise can be made more challenging by standing on a firm pillow or foam mat while you move the leg with tubing.    Resisted terminal knee extension: Make a loop with a piece of elastic tubing by tying a knot in both ends. Close the knot in a door at knee height. Step into the loop with your injured leg so the tubing is around the back of your knee. Lift the other foot off the ground and hold onto a chair for balance, if needed. Bend the knee with tubing about 45  degrees. Slowly straighten your leg, keeping your thigh muscle tight as you do this. Repeat 15 times. Do 2 sets of 15. If you need an easier way to do this, stand on both legs for better support while you do the exercise.  Decline single-leg squat: Stand with both feet on an angled platform or with your heels on a board about 3 inches (8 centimeters) high. Put all of your weight on your injured leg and squat down to a 45-degree angle. Use your other leg to help you return to a standing position from the squat. You should lower your body to a squat using only your injured leg but you can use both legs to return to standing. When this exercise gets easy, hold weights in your hands to make the exercise more difficult. Do 2 sets of 15.    Developed by [employer].  Published by [employer].  Copyright  2014 [employer] and/or one of its subsidiaries. All rights reserved.    References

## 2024-07-18 NOTE — LETTER
7/18/2024      Sudarshan Izaguirre  [address] [id number]      Dear Colleague,    Thank you for referring your patient, Sudarshan Izaguirre, to the [facility] [place]. Please see a copy of my visit note below.    CHIEF COMPLAINT:  No chief complaint on file.       HISTORY OF PRESENT ILLNESS  Mr. Izaguirre is a pleasant 23 year old year old male who presents to clinic today with left knee pain.  Sudarshan explains that in March, he fell and hit his knee on a metal bar.  He states he hasn't been taking care of it much, hasn't been resting and will occasionally bump it.  X-ray on 5/18, and pain has gotten worse since then.    He has been able to perform most exercises on the leg today, however he does have pain with more dynamic exercises such as squat jumps, side lunges.    Onset: sudden  Location: left knee anterior  Quality:  sharp  Duration: 1 years   Severity: 8/10 at worst  Timing:intermittent episodes with increased activity  Modifying factors:  resting and non-use makes it better, movement and use makes it worse, having knee flexed for long periods  Associated signs & symptoms: pain and tenderness  Previous similar pain: No  Treatments to date: ice, NSAIDS    Additional history: as documented    Review of Systems: A 10-point review of systems was obtained and is negative except for as noted in the HPI.     MEDICAL HISTORY  There is no problem list on file for this patient.      Current Outpatient Medications   Medication Sig Dispense Refill     fluticasone (FLONASE) 50 MCG/ACT nasal spray Spray 1 spray into both nostrils daily (Patient not taking: Reported on 5/18/2024) 9.9 mL 0     levothyroxine (SYNTHROID/LEVOTHROID) 137 MCG tablet          No Known Allergies    No family history on file.    Additional medical/Social/Surgical histories reviewed in [facility] and updated as appropriate.    PHYSICAL EXAM  There were no vitals taken for this visit.    General  - normal  appearance, in no obvious distress  Musculoskeletal - left knee  - stance: normal gait without limp  - inspection: no swelling or effusion  - palpation: no joint line tenderness, normal popliteal pulse, tender through entirety of the patellar tendon, tender prepatellar bursal region.  - ROM: 135 degrees flexion, -5 degrees extension, painful passive flexion terminally  - strength: 5/5 in flexion, 5/5 in extension  - special tests:  (-) Lachman  (-) anterior drawer  (-) Justin  (-) Thessaly  (-) varus at 0 and 30 degrees flexion  (-) valgus at 0 and 30 degrees flexion  Neuro  - no sensory or motor deficit, grossly normal coordination, normal muscle tone      IMAGING : XR left knee 3 views. Final results and radiologist's interpretation, available in the [facility] health record. Images were reviewed with the patient/family members in the office today. My personal interpretation of the performed imaging is no acute osseous abnormality or significant degenerative changes of joint.     ASSESSMENT & PLAN  Mr. Izaguirre is a 23 year old year old male who presents to clinic today with chronic left anterior knee pain that seem to begin around an injury last year striking his knee on a metal bar while at a concert venue.    Clinical examination as well as reassuring x-rays today consistent with patellar tendinitis of left knee.  I do believe there is also a component of prepatellar bursitis that is chronic.    Diagnosis: Patellar tendinitis of left knee, chronic pain of left knee    I recommended starting patellar tendon home exercise program.  Discussed a patellar Cho-Pat strap or knee sleeve.  We did fit him for a neoprene sleeve today as he preferred this.  Physical therapy recommended given chronicity and I would like to see him back in 6 to 8 weeks.  Avoid provocative activities in the gym.  If no improvement after this time, I would then recommend considering MRI of his left knee.    It was a pleasure seeing Sudarshan  today.    Angus Botello DO, [facility]  Primary Care Sports Medicine       Again, thank you for allowing me to participate in the care of your patient.        Sincerely,        Angus Botello DO

## 2024-08-01 ENCOUNTER — TELEPHONE (OUTPATIENT)
Dept: ORTHOPEDICS | Facility: CLINIC | Age: 24
End: 2024-08-01
Payer: COMMERCIAL

## 2024-08-01 DIAGNOSIS — M76.52 PATELLAR TENDINITIS, LEFT KNEE: Primary | ICD-10-CM

## 2024-08-01 DIAGNOSIS — M25.562 CHRONIC PAIN OF LEFT KNEE: ICD-10-CM

## 2024-08-01 DIAGNOSIS — G89.29 CHRONIC PAIN OF LEFT KNEE: ICD-10-CM

## 2024-08-01 NOTE — TELEPHONE ENCOUNTER
Other: Patient is calling in because he would like a physical therapy referral. Please send to patient via [employer]. Patient will be going outside of [place], but would like referral sent to him via [employer].      Could we send this information to you in [place] or would you prefer to receive a phone call?:   Patient would prefer a phone call   Okay to leave a detailed message?: Yes at Home number on file [phone] (home)

## 2024-08-01 NOTE — TELEPHONE ENCOUNTER
"Patient last seen 7/18/24 for patellar tendinitis of left knee and left knee pain.     Plan per office visit states:     \"I recommended starting patellar tendon home exercise program. Discussed a patellar Cho-Pat strap or knee sleeve. We did fit him for a neoprene sleeve today as he preferred this. Physical therapy recommended given chronicity and I would like to see him back in 6 to 8 weeks. Avoid provocative activities in the gym. If no improvement after this time, I would then recommend considering MRI of his left knee.\"     No PT order was placed at time of visit. Patient requesting external PT order. Please place referral and notify staff once complete so patient can be contacted.     Luciana Fernandez MSA, ATC  Certified Athletic Trainer   "

## 2024-08-02 NOTE — TELEPHONE ENCOUNTER
Writer assisted in scheduling follow up.     Patient would like PT referral mailed to him. Mailing address discussed. Document placed in outgoing mail.     Luciana Fernandez MSA, ATC  Certified Athletic Trainer

## 2024-08-06 NOTE — PROGRESS NOTES
"ESTABLISHED PATIENT FOLLOW-UP:  Follow Up of the Left Knee       HISTORY OF PRESENT ILLNESS  Mr. Izaguirre is a pleasant 23 year old year old male who presents to clinic today for follow-up of left knee pain    Date of injury: 1 year ago  Date last seen: 7/18/2024  Following Therapeutic Plan: yes, knee brace, PT, Ice  Pain: pain has increased since last visit.  Function: able to perform daily activities, but he is unable to play volleyball.  It is also affecting his sleep, the pain will wake him up at night.  Interval History: Physical therapy x 6 visits.  He has noted absolutely no improvement in his pain level.  However he does feel like his core and knee strength has improved greatly.  He has been using a knee sleeve which she finds quite helpful and is wondering if there are other options as he has to continuously wash this sleeve.  PT would like to try iontophoresis.    Additional medical/Social/Surgical histories reviewed in [facility] and updated as appropriate.    REVIEW OF SYSTEMS (8/6/2024)  CONSTITUTIONAL: Denies fever and weight loss  GASTROINTESTINAL: Denies abdominal pain, nausea, vomiting  MUSCULOSKELETAL: See HPI  SKIN: Denies any recent rash or lesion  NEUROLOGICAL: Denies numbness or focal weakness     PHYSICAL EXAM  /70   Ht 1.753 m (5' 9\")   Wt 74.4 kg (164 lb)   BMI 24.22 kg/m      General  - normal appearance, in no obvious distress  Musculoskeletal - left knee  - stance: normal gait without limp  - inspection: no swelling or effusion  - palpation: no joint line tenderness, normal popliteal pulse, tender through entirety of the patellar tendon, tender prepatellar bursal region.  - ROM: 135 degrees flexion, -5 degrees extension, painful passive flexion terminally  - strength: 5/5 in flexion, 5/5 in extension  - special tests:  (-) Lachman  (-) anterior drawer  (-) Justin  (-) Thessaly  (-) varus at 0 and 30 degrees flexion  (-) valgus at 0 and 30 degrees flexion  Neuro  - no sensory or motor " deficit, grossly normal coordination, normal muscle tone    IMAGING :   EXAM: XR KNEE LEFT 1 VIEW  LOCATION: [facility]  DATE: 5/18/2024     INDICATION: Chronic pain of left knee.  COMPARISON: None.                                                                      IMPRESSION: Normal bones, joint spaces and alignment on this single lateral view.     ASSESSMENT & PLAN  Mr. Izaguirre is a 23 year old year old male who presents to clinic today with Follow Up of the Left Knee    Ongoing pain at the insertion site of the patellar tendon despite 6 visits of physical therapy with Annalee, and overall pain since March 2024.    Diagnosis: Chronic pain of left knee    Additional diagnostic versus treatment measures discussed.  Annalee had requested dexamethasone sodium for iontophoresis which I placed an order for.  He can continue to pursue this, but we will also pursue an MRI of his left knee to rule out distal patellar tendon tear or other.  I have recommended that he purchase a patellar Cho-Pat strap.  He can continue to use his: Knee sleeve if it is helping.  Continue with his home exercise program addressing core as well as knee strength.    I will MyChart message him with MRI results and we can discuss any additional next steps.    It was a pleasure seeing Sudarshan.    Angus Botello DO, CAM  Primary Care Sports Medicine

## 2024-08-13 ENCOUNTER — TRANSFERRED RECORDS (OUTPATIENT)
Dept: HEALTH INFORMATION MANAGEMENT | Facility: CLINIC | Age: 24
End: 2024-08-13
Payer: COMMERCIAL

## 2024-08-29 ENCOUNTER — TELEPHONE (OUTPATIENT)
Dept: ORTHOPEDICS | Facility: CLINIC | Age: 24
End: 2024-08-29
Payer: COMMERCIAL

## 2024-08-29 DIAGNOSIS — M76.52 PATELLAR TENDINITIS, LEFT KNEE: Primary | ICD-10-CM

## 2024-08-29 NOTE — TELEPHONE ENCOUNTER
Upon chart review, outside PT order that was scanned in to chart on 8/13/24 and signed by provider notes the following on the last page under additional detail:         It does not appear provider sent a prescription of dexamethasone to patient's pharmacy for iontophoresis treatment. If in agreement with this plan please send prescription as recommended by PT.     Luciana Fernandez MSA, ATC  Certified Athletic Trainer

## 2024-08-29 NOTE — TELEPHONE ENCOUNTER
Health Call Center    Phone Message    May a detailed message be left on voicemail: yes     Reason for Call: Other: Pt calling in today stating that his PT provider prescribed a topical medication for him but that it needs to be ok'd by Dr. Botello first?? Please call pt back to discuss. He also states that the order was sent to Dr. Botello 3 weeks ago and that he's been waiting. Call pt back on home number listed     Action Taken: Other: csc    Travel Screening: Not Applicable     Date of Service:

## 2024-09-03 RX ORDER — DEXAMETHASONE SODIUM PHOSPHATE 4 MG/ML
INJECTION, SOLUTION INTRA-ARTICULAR; INTRALESIONAL; INTRAMUSCULAR; INTRAVENOUS; SOFT TISSUE
Qty: 5 ML | Refills: 1 | Status: SHIPPED | OUTPATIENT
Start: 2024-09-03

## 2024-09-05 ENCOUNTER — OFFICE VISIT (OUTPATIENT)
Dept: ORTHOPEDICS | Facility: CLINIC | Age: 24
End: 2024-09-05
Payer: COMMERCIAL

## 2024-09-05 VITALS
DIASTOLIC BLOOD PRESSURE: 70 MMHG | BODY MASS INDEX: 24.29 KG/M2 | WEIGHT: 164 LBS | SYSTOLIC BLOOD PRESSURE: 126 MMHG | HEIGHT: 69 IN

## 2024-09-05 DIAGNOSIS — G89.29 CHRONIC PAIN OF LEFT KNEE: Primary | ICD-10-CM

## 2024-09-05 DIAGNOSIS — M25.562 CHRONIC PAIN OF LEFT KNEE: Primary | ICD-10-CM

## 2024-09-05 PROCEDURE — 99213 OFFICE O/P EST LOW 20 MIN: CPT | Performed by: FAMILY MEDICINE

## 2024-09-05 NOTE — LETTER
"9/5/2024      Sudarshan Izaguirre  [address] [address]      Dear Colleague,    Thank you for referring your patient, Sudarshan Izaguirre, to the [facility] [place]. Please see a copy of my visit note below.    ESTABLISHED PATIENT FOLLOW-UP:  Follow Up of the Left Knee       HISTORY OF PRESENT ILLNESS  Mr. Izaguirre is a pleasant 23 year old year old male who presents to clinic today for follow-up of left knee pain    Date of injury: 1 year ago  Date last seen: 7/18/2024  Following Therapeutic Plan: yes, knee brace, PT, Ice  Pain: pain has increased since last visit.  Function: able to perform daily activities, but he is unable to play volleyball.  It is also affecting his sleep, the pain will wake him up at night.  Interval History: Physical therapy x 6 visits.  He has noted absolutely no improvement in his pain level.  However he does feel like his core and knee strength has improved greatly.  He has been using a knee sleeve which she finds quite helpful and is wondering if there are other options as he has to continuously wash this sleeve.  PT would like to try iontophoresis.    Additional medical/Social/Surgical histories reviewed in [facility] and updated as appropriate.    REVIEW OF SYSTEMS (8/6/2024)  CONSTITUTIONAL: Denies fever and weight loss  GASTROINTESTINAL: Denies abdominal pain, nausea, vomiting  MUSCULOSKELETAL: See HPI  SKIN: Denies any recent rash or lesion  NEUROLOGICAL: Denies numbness or focal weakness     PHYSICAL EXAM  /70   Ht 1.753 m (5' 9\")   Wt 74.4 kg (164 lb)   BMI 24.22 kg/m      General  - normal appearance, in no obvious distress  Musculoskeletal - left knee  - stance: normal gait without limp  - inspection: no swelling or effusion  - palpation: no joint line tenderness, normal popliteal pulse, tender through entirety of the patellar tendon, tender prepatellar bursal region.  - ROM: 135 degrees flexion, -5 degrees extension, " painful passive flexion terminally  - strength: 5/5 in flexion, 5/5 in extension  - special tests:  (-) Lachman  (-) anterior drawer  (-) Justin  (-) Thessaly  (-) varus at 0 and 30 degrees flexion  (-) valgus at 0 and 30 degrees flexion  Neuro  - no sensory or motor deficit, grossly normal coordination, normal muscle tone    IMAGING :   EXAM: XR KNEE LEFT 1 VIEW  LOCATION: [facility]  DATE: 5/18/2024     INDICATION: Chronic pain of left knee.  COMPARISON: None.                                                                      IMPRESSION: Normal bones, joint spaces and alignment on this single lateral view.     ASSESSMENT & PLAN  Mr. Izaguirre is a 23 year old year old male who presents to clinic today with Follow Up of the Left Knee    Ongoing pain at the insertion site of the patellar tendon despite 6 visits of physical therapy with Annalee, and overall pain since March 2024.    Diagnosis: Chronic pain of left knee    Additional diagnostic versus treatment measures discussed.  Annalee had requested dexamethasone sodium for iontophoresis which I placed an order for.  He can continue to pursue this, but we will also pursue an MRI of his left knee to rule out distal patellar tendon tear or other.  I have recommended that he purchase a patellar Cho-Pat strap.  He can continue to use his: Knee sleeve if it is helping.  Continue with his home exercise program addressing core as well as knee strength.    I will MyChart message him with MRI results and we can discuss any additional next steps.    It was a pleasure seeing Sudarshan.    Angus Botello DO, [facility]  Primary Care Sports Medicine      Again, thank you for allowing me to participate in the care of your patient.        Sincerely,        Angus Botello DO

## 2024-09-05 NOTE — PATIENT INSTRUCTIONS
Thank you for choosing [facility]    DR MENA[facility]S CLINIC LOCATIONS  [place] [address] [address] [facility], [address]th [place]   [place], [place], [address] [place] [address]   [phone]       APPOINTMENTS: [phone]    CARE QUESTIONS: [phone],    BILLING QUESTIONS: [phone]    FAX NUMBER: [phone]        Follow up: after MRI       1. Chronic pain of left knee              An order for an MRI was placed today. You may call directly to schedule at [phone] at your convenience.

## 2024-09-25 ENCOUNTER — HOSPITAL ENCOUNTER (OUTPATIENT)
Dept: MRI IMAGING | Facility: CLINIC | Age: 24
Discharge: HOME | End: 2024-09-25
Attending: FAMILY MEDICINE | Admitting: FAMILY MEDICINE
Payer: COMMERCIAL

## 2024-09-25 DIAGNOSIS — M25.562 CHRONIC PAIN OF LEFT KNEE: ICD-10-CM

## 2024-09-25 DIAGNOSIS — G89.29 CHRONIC PAIN OF LEFT KNEE: ICD-10-CM

## 2024-09-25 PROCEDURE — 73721 MRI JNT OF LWR EXTRE W/O DYE: CPT | Mod: 26 | Performed by: RADIOLOGY

## 2024-09-25 PROCEDURE — 73721 MRI JNT OF LWR EXTRE W/O DYE: CPT | Mod: LT

## 2024-11-07 ENCOUNTER — OFFICE VISIT (OUTPATIENT)
Dept: ORTHOPEDICS | Facility: CLINIC | Age: 24
End: 2024-11-07
Payer: COMMERCIAL

## 2024-11-07 VITALS — SYSTOLIC BLOOD PRESSURE: 118 MMHG | DIASTOLIC BLOOD PRESSURE: 78 MMHG

## 2024-11-07 DIAGNOSIS — M76.52 PATELLAR TENDINITIS, LEFT KNEE: ICD-10-CM

## 2024-11-07 DIAGNOSIS — M84.369D STRESS FRACTURE OF TIBIA DUE TO MULTIPLE OR REPETITIVE STRESS WITH ROUTINE HEALING, SUBSEQUENT ENCOUNTER: Primary | ICD-10-CM

## 2024-11-07 PROCEDURE — 99213 OFFICE O/P EST LOW 20 MIN: CPT | Performed by: FAMILY MEDICINE

## 2024-11-07 NOTE — LETTER
11/7/2024      Sudarshan Izaguirre  [address] [facility] [address] [address]      Dear Colleague,    Thank you for referring your patient, Sudarshan Izaguirre, to the [facility]IN[facility] [place]. Please see a copy of my visit note below.    ESTABLISHED PATIENT FOLLOW-UP:  Follow Up of the Left Knee     HISTORY OF PRESENT ILLNESS  Mr. Izaguirre is a pleasant 24 year old year old male who presents to clinic today for follow-up of left knee pain. Reports pain has been on and off    Date of injury: 1 year ago  Date last seen: 9/5/2024  Following Therapeutic Plan: Physical Therapy: Helpful  Pain: Similar pain, migrating to lateral patella  Function: Improved, working out has been positive  Interval History: Patient reports to clinic with left knee pain. Previously he reported patellar tendon pain  but now the pain is located on the lateral portion of the patella. He is able to workout and perform normal activity with occasional pain.      Additional medical/Social/Surgical histories reviewed in [facility] and updated as appropriate.    REVIEW OF SYSTEMS (11/7/2024)  CONSTITUTIONAL: Denies fever and weight loss  GASTROINTESTINAL: Denies abdominal pain, nausea, vomiting  MUSCULOSKELETAL: See HPI  SKIN: Denies any recent rash or lesion  NEUROLOGICAL: Denies numbness or focal weakness     PHYSICAL EXAM  /78     General  - normal appearance, in no obvious distress  Musculoskeletal - left knee  - stance: normal gait without limp  - inspection: no swelling or effusion  - palpation: no joint line tenderness, normal popliteal pulse, tender through entirety of the patellar tendon, tender prepatellar bursal region, tenderness at tibial tuberosity and tibia adjacent patellar tendon.  - ROM: 135 degrees flexion, -5 degrees extension  - strength: 5/5 in flexion, 5/5 in extension  - special tests:  (-) Lachman  (-) anterior drawer  (-) Justin  (-) Thessaly  (-) varus at 0 and 30 degrees flexion  (-) valgus at 0  and 30 degrees flexion  Neuro  - no sensory or motor deficit, grossly normal coordination, normal muscle tone    IMAGING :   MR left knee without contrast 9/25/2024 7:18 PM     Techniques: Multiplanar multisequence imaging of the left knee was  obtained without administration of intra-articular or intravenous  contrast using routine protocol.     History: Chronic pain of left knee; Chronic pain of left knee     Comparison: Knee radiograph 5/18/2024     Findings: Anteromedial external marker is placed at the level of  tibial tuberosity.     MENISCI:  Medial meniscus: Intact.  Lateral meniscus: Intact.     LIGAMENTS  Cruciate ligaments: Intact.  Medial supporting structures: Intact.  Lateral supporting structures: Intact.     EXTENSOR MECHANISM  Patellar tendinosis with diffuse thickening, mild peritendinous edema.  Visualized quadriceps tendon is intact.     FLUID  Small joint effusion. No substantial Baker's cyst.     OSSEOUS and ARTICULAR STRUCTURES  Bones: Mild edema like signal in the tibial tuberosity apophysis  physeal scar area. No fracture, contusion, or osseous lesion is seen.  Presumed bone islands in proximal tibial shaft and medial femoral  condyle.      Patellofemoral compartment: No hyaline cartilage disease.     Medial compartment: Subtle subchondral edema, also intensity at the  posterior weightbearing surface of medial femoral condyle (image 10  series 1001), nonspecific. Overlying cartilage appears grossly intact.     Lateral compartment: No high-grade chondromalacia.     ANCILLARY FINDINGS  None.                                                                      Impression: Patellar tendinosis.     I have personally reviewed the examination and initial interpretation  and I agree with the findings.     RICHI PAIGE     Impression:   1. Corresponding to the external marker, nonspecific edema like marrow  signal intensity at tibial tuberosity apophyseal physis scar area,  query stress  reaction.  2. Patellar tendinosis.     RICHI PAIGE      ASSESSMENT & PLAN  Mr. Izaguirre is a 24 year old year old male who presents to clinic today with Follow Up of the Left Knee    Ongoing knee pain despite extensive PT x 8+ visits including iontophoresis on patellar tendon.  Imaging discussed directly with one of our [facility] radiologist who read/brought the report.  Concern for possible stress reaction of the tibial tuberosity apophyseal physis however unable to discern whether it could just be nonspecific.    Diagnosis: Chronic pain of left knee, patellar tendinosis of left knee    Sudarshan and I had a detailed discussion about the MRI results.  We discussed that given lack of his physical therapy progress which should certainly have improved his condition if indeed this was solely patellar tendinosis.  I have recommended a period of offloading and decreasing activity.  I did recommend use of crutches for partial weightbearing over the next 4 to 6 weeks, however Sudarshan made it clear that he would not be compliant with this recommendation.  In lieu of this, I do appreciate his honesty, I have recommended a duration of low activity, decreasing high impact exercise, avoiding dancing, as well as prolonged walking.  I will like him to hold off on physical therapy for now.  We will repeat MRI in 6 weeks to further investigate whether this indeed is a stress reaction, per recommendation of Dr. Paige.  I will mychart message Sudarshan in 6 weeks.    It was a pleasure seeing Sudarshan.    Angus Botello DO, [facility]  Primary Care Sports Medicine      Again, thank you for allowing me to participate in the care of your patient.        Sincerely,        Angus Botello DO

## 2024-11-07 NOTE — PROGRESS NOTES
ESTABLISHED PATIENT FOLLOW-UP:  Follow Up of the Left Knee     HISTORY OF PRESENT ILLNESS  Mr. Izaguirre is a pleasant 24 year old year old male who presents to clinic today for follow-up of left knee pain. Reports pain has been on and off    Date of injury: 1 year ago  Date last seen: 9/5/2024  Following Therapeutic Plan: Physical Therapy: Helpful  Pain: Similar pain, migrating to lateral patella  Function: Improved, working out has been positive  Interval History: Patient reports to clinic with left knee pain. Previously he reported patellar tendon pain  but now the pain is located on the lateral portion of the patella. He is able to workout and perform normal activity with occasional pain.      Additional medical/Social/Surgical histories reviewed in [facility] and updated as appropriate.    REVIEW OF SYSTEMS (11/7/2024)  CONSTITUTIONAL: Denies fever and weight loss  GASTROINTESTINAL: Denies abdominal pain, nausea, vomiting  MUSCULOSKELETAL: See HPI  SKIN: Denies any recent rash or lesion  NEUROLOGICAL: Denies numbness or focal weakness     PHYSICAL EXAM  /78     General  - normal appearance, in no obvious distress  Musculoskeletal - left knee  - stance: normal gait without limp  - inspection: no swelling or effusion  - palpation: no joint line tenderness, normal popliteal pulse, tender through entirety of the patellar tendon, tender prepatellar bursal region, tenderness at tibial tuberosity and tibia adjacent patellar tendon.  - ROM: 135 degrees flexion, -5 degrees extension  - strength: 5/5 in flexion, 5/5 in extension  - special tests:  (-) Lachman  (-) anterior drawer  (-) Justin  (-) Thessaly  (-) varus at 0 and 30 degrees flexion  (-) valgus at 0 and 30 degrees flexion  Neuro  - no sensory or motor deficit, grossly normal coordination, normal muscle tone    IMAGING :   MR left knee without contrast 9/25/2024 7:18 PM     Techniques: Multiplanar multisequence imaging of the left knee was  obtained without  administration of intra-articular or intravenous  contrast using routine protocol.     History: Chronic pain of left knee; Chronic pain of left knee     Comparison: Knee radiograph 5/18/2024     Findings: Anteromedial external marker is placed at the level of  tibial tuberosity.     MENISCI:  Medial meniscus: Intact.  Lateral meniscus: Intact.     LIGAMENTS  Cruciate ligaments: Intact.  Medial supporting structures: Intact.  Lateral supporting structures: Intact.     EXTENSOR MECHANISM  Patellar tendinosis with diffuse thickening, mild peritendinous edema.  Visualized quadriceps tendon is intact.     FLUID  Small joint effusion. No substantial Baker's cyst.     OSSEOUS and ARTICULAR STRUCTURES  Bones: Mild edema like signal in the tibial tuberosity apophysis  physeal scar area. No fracture, contusion, or osseous lesion is seen.  Presumed bone islands in proximal tibial shaft and medial femoral  condyle.      Patellofemoral compartment: No hyaline cartilage disease.     Medial compartment: Subtle subchondral edema, also intensity at the  posterior weightbearing surface of medial femoral condyle (image 10  series 1001), nonspecific. Overlying cartilage appears grossly intact.     Lateral compartment: No high-grade chondromalacia.     ANCILLARY FINDINGS  None.                                                                      Impression: Patellar tendinosis.     I have personally reviewed the examination and initial interpretation  and I agree with the findings.     RICHI PAIGE     Impression:   1. Corresponding to the external marker, nonspecific edema like marrow  signal intensity at tibial tuberosity apophyseal physis scar area,  query stress reaction.  2. Patellar tendinosis.     RICHI PAIGE      ASSESSMENT & PLAN  Mr. Izaguirre is a 24 year old year old male who presents to clinic today with Follow Up of the Left Knee    Ongoing knee pain despite extensive PT x 8+ visits including iontophoresis on  patellar tendon.  Imaging discussed directly with one of our [facility] radiologist who read/brought the report.  Concern for possible stress reaction of the tibial tuberosity apophyseal physis however unable to discern whether it could just be nonspecific.    Diagnosis: Chronic pain of left knee, patellar tendinosis of left knee    Sudarshan and I had a detailed discussion about the MRI results.  We discussed that given lack of his physical therapy progress which should certainly have improved his condition if indeed this was solely patellar tendinosis.  I have recommended a period of offloading and decreasing activity.  I did recommend use of crutches for partial weightbearing over the next 4 to 6 weeks, however Sudarshan made it clear that he would not be compliant with this recommendation.  In lieu of this, I do appreciate his honesty, I have recommended a duration of low activity, decreasing high impact exercise, avoiding dancing, as well as prolonged walking.  I will like him to hold off on physical therapy for now.  We will repeat MRI in 6 weeks to further investigate whether this indeed is a stress reaction, per recommendation of Dr. Martin.  I will mychart message Sudarshan in 6 weeks.    It was a pleasure seeing Sudarshan.    Angus Botello DO, [facility]  Primary Care Sports Medicine

## 2025-01-09 ENCOUNTER — MYC MEDICAL ADVICE (OUTPATIENT)
Dept: ORTHOPEDICS | Facility: CLINIC | Age: 25
End: 2025-01-09
Payer: COMMERCIAL

## 2025-01-14 NOTE — TELEPHONE ENCOUNTER
"Per Dr. Botello on radiology read on left knee MRI:  \"It looks like the bony contusion or stress reaction at the tibia has healed and resolved. Tendinosis persist at patella - chronic changes of patellar region. The other findings are rather nonspecific and a bit of edema around the thigh and fat pad.  Simple bruise of knee or many nonspecific things could cause this and not to worry about these last two findings.       If you were open to the patellar tendon procedure - I would recommended we tried a diagnostic injection or lidocaine injection into patellar tendon to see if it helps. If so, we could try tenex procedure as discussed previously.       If he is open to discussion and injection, we should set up for a 40 minute procedure visit in [place] - diagnostic patellar tendon bupivacaine injection.\"  "

## 2025-01-14 NOTE — TELEPHONE ENCOUNTER
Outbound call to patient to ensure correct scheduling. Patient was very appreciative, and was very polite to speak with.    During the conversation to schedule, patient inquired about a more in depth discussion with Dr. Botello about the injection prior to proceeding - this has been noted in the appointment notes. Patient inquired about how the injection process works, and writer informed him that it varies with providers, and explained how the process goes with Dr. Holland. Patient also stated that he has a fear of needles, and would appreciate extra numbing materials if at all possible to help with this. Writer explained that some providers do this automatically, but Dr. Botello would be notified of request, and it will be noted in TE. Patient was very appreciative of the extra information.    Kady Simons, RICARDO, LAT, ATC

## 2025-01-14 NOTE — TELEPHONE ENCOUNTER
It looks like the bony contusion or stress reaction at the tibia has healed and resolved. Tendinosis persist at patella - chronic changes of patellar region. The other findings are rather nonspecific and a bit of edema around the thigh and fat pad.  Simple bruise of knee or many nonspecific things could cause this and not to worry about these last two findings.      If you were open to the patellar tendon procedure - I would recommended we tried a diagnostic injection or lidocaine injection into patellar tendon to see if it helps. If so, we could try tenex procedure as discussed previously.      If he is open to discussion and injection, we should set up for a 40 minute procedure visit in [place] - diagnostic patellar tendon bupivacaine injection.

## 2025-03-27 ENCOUNTER — OFFICE VISIT (OUTPATIENT)
Dept: ORTHOPEDICS | Facility: CLINIC | Age: 25
End: 2025-03-27

## 2025-03-27 ENCOUNTER — ANCILLARY PROCEDURE (OUTPATIENT)
Dept: GENERAL RADIOLOGY | Facility: CLINIC | Age: 25
End: 2025-03-27
Attending: FAMILY MEDICINE

## 2025-03-27 DIAGNOSIS — M54.2 CERVICAL PAIN (NECK): ICD-10-CM

## 2025-03-27 DIAGNOSIS — M54.2 CERVICALGIA: Primary | ICD-10-CM

## 2025-03-27 NOTE — PROGRESS NOTES
CHIEF COMPLAINT:  Pain of the Neck     HISTORY OF PRESENT ILLNESS  Mr. Izaguirre is a pleasant 24 year old year old male who presents to clinic today with neck pain.  Sudarshan explains that he has had neck pain since he was 15 years old.  No injury and this has not been persistent but waxes and wanes. He notes in the last few years he has been consistent with chiropractic care and massage.  He is interested in other avenues to address this.    Pain at mid cervical and into thoracic region. No radiation down arms. No numbness or tingling.  He was urged by his chiropractor to pursue medical evaluation.    Onset: gradual  Location: neck  Quality:  dull and sharp  Duration: 9 years   Severity: 9/10 at worst  Timing:constant  Modifying factors:  resting and non-use makes it better, movement and use makes it worse  Associated signs & symptoms: pain and tenderness  Previous similar pain: No  Treatments to date: Chiropractor Adjustments, Massages, Cupping    Additional history: as documented    Review of Systems: A 10-point review of systems was obtained and is negative except for as noted in the HPI.     MEDICAL HISTORY  There is no problem list on file for this patient.      Current Outpatient Medications   Medication Sig Dispense Refill    dexAMETHasone (DECADRON) 4 MG/ML injection Use 4 mg or dose determined by provider for iontophoresis. 5 mL 1    fluticasone (FLONASE) 50 MCG/ACT nasal spray Spray 1 spray into both nostrils daily (Patient not taking: Reported on 5/18/2024) 9.9 mL 0    levothyroxine (SYNTHROID/LEVOTHROID) 137 MCG tablet          No Known Allergies    No family history on file.    Additional medical/Social/Surgical histories reviewed in [facility] and updated as appropriate.       PHYSICAL EXAM  There were no vitals taken for this visit.    General  - normal appearance, in no obvious distress  Musculoskeletal - cervical spine  - inspection: loss of lordotic curvature  - palpation: Tender right cervical paraspinals,  "right trapezius. No suboccipital tenderness.  - ROM: pain and limitation with left rotation and left, right sidebending.  Painless flexion and extension.  - strength: upper extremities 5/5 in all planes  Neuro  - C5-7 DTRs 2+ bilaterally, no sensory or motor deficit, grossly normal coordination, normal muscle tone    IMAGING : XR cervical 2 views. Final results and radiologist's interpretation, available in the [facility] health record. Images were reviewed with the patient/family members in the office today. My personal interpretation of the performed imaging is no acute osseous abnormality or significant degenerative changes of joint. Loss of cervical lordosis. No disc height loss or DJD joints.     ASSESSMENT & PLAN  Mr. Izaguirre is a 24 year old year old male who presents to clinic today with chronic neck pain increasing in intensity over the past 9 years.    Cervicalgia suspected to be relating to [facility] etiology. No significant disc space narrowing or anatomic abnormalities on xray.     Diagnosis:   Cervicalgia    We discussed multimodal approach to improve cervicalgia.  Suspect posture and ergonomics, frequently on computer for hours as culprit for persisting pain. No evidence for structural or degenerative finding on XR.    -Start formal physical therapy for eval/treat. Strengthening, daily stretching routine he can pursue throughout the day to \"reset\" addressing tight pecs, scalenes, cervical paraspinals.  -Recommended ergonomic changes for work, provided ergonomic diagram for work setting  -Reviewed oral medications, prescriptions and together decided to hold off  -Massage therapy order placed and printed, instructed to find local [facility] that does bill insurance company  -Chiropractic treatments as mentioned to continue   -Follow up in 3 months PRN    It was a pleasure seeing Sudarshan today.    Angus Botello DO, [facility]    Primary Care Sports Medicine  Department of Orthopedic Surgery  [facility]" [place]

## 2025-03-27 NOTE — LETTER
3/27/2025      Sudarshan Izaguirre  [address] [facility] [id number]      Dear Colleague,    Thank you for referring your patient, Sudarshan Izaguirre, to the [facility] [place]. Please see a copy of my visit note below.    CHIEF COMPLAINT:  Pain of the Neck     HISTORY OF PRESENT ILLNESS  Mr. Izaguirre is a pleasant 24 year old year old male who presents to clinic today with neck pain.  Sudarshan explains that he has had neck pain since he was 15 years old.  No injury and this has not been persistent but waxes and wanes. He notes in the last few years he has been consistent with chiropractic care and massage.  He is interested in other avenues to address this.    Pain at mid cervical and into thoracic region. No radiation down arms. No numbness or tingling.  He was urged by his chiropractor to pursue medical evaluation.    Onset: gradual  Location: neck  Quality:  dull and sharp  Duration: 9 years   Severity: 9/10 at worst  Timing:constant  Modifying factors:  resting and non-use makes it better, movement and use makes it worse  Associated signs & symptoms: pain and tenderness  Previous similar pain: No  Treatments to date: Chiropractor Adjustments, Massages, Cupping    Additional history: as documented    Review of Systems: A 10-point review of systems was obtained and is negative except for as noted in the HPI.     MEDICAL HISTORY  There is no problem list on file for this patient.      Current Outpatient Medications   Medication Sig Dispense Refill     dexAMETHasone (DECADRON) 4 MG/ML injection Use 4 mg or dose determined by provider for iontophoresis. 5 mL 1     fluticasone (FLONASE) 50 MCG/ACT nasal spray Spray 1 spray into both nostrils daily (Patient not taking: Reported on 5/18/2024) 9.9 mL 0     levothyroxine (SYNTHROID/LEVOTHROID) 137 MCG tablet          No Known Allergies    No family history on file.    Additional medical/Social/Surgical histories reviewed in EPIC  "and updated as appropriate.       PHYSICAL EXAM  There were no vitals taken for this visit.    General  - normal appearance, in no obvious distress  Musculoskeletal - cervical spine  - inspection: loss of lordotic curvature  - palpation: Tender right cervical paraspinals, right trapezius. No suboccipital tenderness.  - ROM: pain and limitation with left rotation and left, right sidebending.  Painless flexion and extension.  - strength: upper extremities 5/5 in all planes  Neuro  - C5-7 DTRs 2+ bilaterally, no sensory or motor deficit, grossly normal coordination, normal muscle tone    IMAGING : XR cervical 2 views. Final results and radiologist's interpretation, available in the [facility] record. Images were reviewed with the patient/family members in the office today. My personal interpretation of the performed imaging is no acute osseous abnormality or significant degenerative changes of joint. Loss of cervical lordosis. No disc height loss or DJD joints.     ASSESSMENT & PLAN  Mr. Izaguirre is a 24 year old year old male who presents to clinic today with chronic neck pain increasing in intensity over the past 9 years.    Cervicalgia suspected to be relating to MSK etiology. No significant disc space narrowing or anatomic abnormalities on xray.     Diagnosis:   Cervicalgia    We discussed multimodal approach to improve cervicalgia.  Suspect posture and ergonomics, frequently on computer for hours as culprit for persisting pain. No evidence for structural or degenerative finding on XR.    -Start formal physical therapy for eval/treat. Strengthening, daily stretching routine he can pursue throughout the day to \"reset\" addressing tight pecs, scalenes, cervical paraspinals.  -Recommended ergonomic changes for work, provided ergonomic diagram for work setting  -Reviewed oral medications, prescriptions and together decided to hold off  -Massage therapy order placed and printed, instructed to find local masseuse that does " bill insurance company  -Chiropractic treatments as mentioned to continue   -Follow up in 3 months PRN    It was a pleasure seeing Sudarshan today.    Angus Botello DO, CAM    Primary Care Sports Medicine  Department of Orthopedic Surgery  [facility]      Again, thank you for allowing me to participate in the care of your patient.        Sincerely,        Angus Botello DO    Electronically signed

## 2025-06-12 ENCOUNTER — OFFICE VISIT (OUTPATIENT)
Dept: ORTHOPEDICS | Facility: CLINIC | Age: 25
End: 2025-06-12
Payer: COMMERCIAL

## 2025-06-12 DIAGNOSIS — M76.822 POSTERIOR TIBIAL TENDON DYSFUNCTION (PTTD) OF BOTH LOWER EXTREMITIES: Primary | ICD-10-CM

## 2025-06-12 DIAGNOSIS — M76.821 POSTERIOR TIBIAL TENDON DYSFUNCTION (PTTD) OF BOTH LOWER EXTREMITIES: Primary | ICD-10-CM

## 2025-06-12 NOTE — LETTER
6/12/2025      Sudarshan Izaguirre  [address] [id number]      Dear Colleague,    Thank you for referring your patient, Sudarshan Izaguirre, to the [facility] [place]. Please see a copy of my visit note below.    CHIEF COMPLAINT:  Pain of the Left Foot     HISTORY OF PRESENT ILLNESS  Mr. Izaguirre is a pleasant 24 year old year old male who presents to clinic today with left foot pain.  Sudarshan explains that he's had left foot pain since his last visit with 3/27/25 but worsening 2 days of left foot without injury. He states that over time with going to the gym and giong to concerts he's had an increase in his pain. Pain is worse with weightbearing activities. He also mentions that he would like to discuss absence of work. His job involves climbing on roofs.     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  He would not feel safe doing so with current level of function.    Additional history: as documented    Review of Systems:  Have you recently had a a fever, chills, weight loss? No  Do you have any vision problems? No  Do you have any chest pain or edema? No  Do you have any shortness of breath or wheezing?  No  Do you have stomach problems? No  Do you have any numbness or focal weakness? No  Do you have diabetes? No  Do you have problems with bleeding or clotting? No  Do you have an rashes or other skin lesions? No    MEDICAL HISTORY  There is no problem list on file for this patient.      Current Outpatient Medications    Medication Sig Dispense Refill     dexAMETHasone (DECADRON) 4 MG/ML injection Use 4 mg or dose determined by provider for iontophoresis. 5 mL 1     levothyroxine (SYNTHROID/LEVOTHROID) 137 MCG tablet        fluticasone (FLONASE) 50 MCG/ACT nasal spray Spray 1 spray into both nostrils daily (Patient not taking: Reported on 5/18/2024) 9.9 mL 0       No Known Allergies    No family history on file.    Additional medical/Social/Surgical histories [facility] in [facility] and updated as appropriate.       PHYSICAL EXAM  There were no vitals taken for this visit.    General  - normal appearance, in no obvious distress  Musculoskeletal - left foot  - stance: normal gait without limp, pes planus deformity weightbearing only bilateral  - inspection: no swelling or effusion, normal bone and joint alignment, no obvious deformity  - palpation: tender at the medial calcaneal tubercle into proximal arch.  Tenderness at left achilles tendon.  Tenderness at lateral ankle at peroneal tendons from distal fibula to base of fifth metatarsal. Additional tenderness at fifth MTPJ.  - ROM: normal active and passive ROM of great and lesser toes, no pain with MT translation. Ankle with reduced inversion and eversion.    - strength: 5/5 in all planes except 4-/5 eversion against resistance.  Neuro  - no sensory or motor deficit, grossly normal coordination, normal muscle tone    IMAGING : Deferred today     ASSESSMENT & PLAN  Mr. Izaguirre is a 24 year old year old male who presents to clinic today with acute left lateral sided ankle and foot pain after gym related injury performing squats 2 days ago.  This is in the setting of persisting calcaneal pain at plantar aspect of foot.    Diagnosis:   Plantar fasciitis of left foot  Peroneal tendon strain left foot  Posterior tibial tendon dysfunction bilateral feet    Treatment options discussed and I have recommended starting a home exercise program for peroneal tendon.  He will also continue plantar  fascia stretches. I have also added achilles stretching program.  Discussed appropriate footwear and avoiding thin minimally supportive shoes.  Orthotics order placed for bilateral full length orthotics. Ibuprofen or aleve as needed for lateral ankle strain.  Consider ankle support sleeve or brace.     We reviewed job descriptions and I have recommended 2 weeks away from work. After this time may return to work without restriction.    It was a pleasure seeing Sudarshan today.    Angus Botello DO, CAM    Primary Care Sports Medicine  Department of Orthopedic Surgery  [facility]      Again, thank you for allowing me to participate in the care of your patient.        Sincerely,        Angus Botello DO    Electronically signed

## 2025-06-12 NOTE — PATIENT INSTRUCTIONS
PERONEAL TENDONITIS  What is a peroneal tendon injury?   A peroneal tendon injury is a problem with the tendons and muscles on the outer side of your lower leg and foot. Tendons are strong bands of tissue that attach muscle to bone. The peroneal tendons help keep your foot and ankle stable when you walk.   Tendons can be injured suddenly or they may be slowly damaged over time. You can have tiny or partial tears in your tendon. If you have a complete tear of your tendon, it is called a rupture. Other tendon injuries may be called a strain, tendinosis, or tendonitis.  What is the cause?   Peroneal injuries can be caused by:  Overuse of the tendon from a sport or work activity that causes your foot and ankle to roll inward, like when you run on sloped surfaces or run in shoes that are getting worn out on the outside of the heel.   A sudden activity that forces your foot upward toward your shin, like landing on your feet after a fall, or rolling your ankle on a rock while running.   What are the symptoms?   You may hear a pop or a snap when the injury happens. You may have pain and swelling on the outer side of your lower leg or ankle.   How is it diagnosed?   Your healthcare provider will examine you and ask about your symptoms, activities, and medical history. You may have X-rays or other scans.  How is it treated?   While you are recovering from your injury, you will need to change your sport or activity to one that will not make your condition worse. For example, you may need to swim instead of run.   Your healthcare provider may recommend stretching and strengthening exercises to help you heal.  Use an elastic bandage or an ankle brace as directed by your provider. You may need to use crutches until you can walk without pain.   The pain often gets better within a few weeks with self-care, but some injuries may take several months or longer to heal. It s important to follow all of your healthcare provider s  instructions.  How can I take care of myself?   To help relieve swelling and pain:  Put an ice pack, gel pack, or package of frozen vegetables wrapped in a cloth, on the area every 3 to 4 hours for up to 20 minutes at a time.   Do ice massage. To do this, first freeze water in a Styrofoam cup, then peel the top of the cup away to expose the ice. Hold the bottom of the cup and rub the ice over your tendon for 5 to 10 minutes. Do this several times a day while you have pain.   Keep your ankle up on a pillow when you sit or lie down.   Take pain medicine, such as acetaminophen, ibuprofen, or other medicine as directed by your provider. Nonsteroidal anti-inflammatory medicines (NSAIDs), such as ibuprofen, may cause stomach bleeding and other problems. These risks increase with age. Read the label and take as directed. Unless recommended by your healthcare provider, do not take for more than 10 days.  Moist heat may help relax your muscles and make it easier to move your leg. Put moist heat on the injured area for 10 to 15 minutes at a time before you do warm-up and stretching exercises. Moist heat includes heat patches or moist heating pads that you can purchase at most [employer], a wet washcloth or towel that has been heated in the dryer, or a hot shower. Don t use heat if you have swelling.   Follow your healthcare provider's instructions, including any exercises recommended by your provider. Ask your provider:  How and when you will hear your test results   How long it will take to recover   What activities you should avoid, including how much you can lift, and when you can return to your normal activities   How to take care of yourself at home   What symptoms or problems you should watch for and what to do if you have them  Make sure you know when you should come back for a checkup.  How can I help prevent a peroneal tendon injury?   Warm-up exercises and stretching before activities can help prevent injuries. For  example, do exercises that keep your ankles and leg muscles strong. If your leg or ankle hurts after exercise, putting ice on it may help keep it from getting injured.   Follow safety rules and use any protective equipment recommended for your work or sport. For example, wear high-top athletic shoes or a supportive ankle brace. When you run, choose level surfaces and avoid rocks or holes.  Developed by [employer].  Published by [employer].  Copyright  2014 [employer] and/or one of its subsidiaries. All rights reserved.                Peroneal Tendon Exercises  You may start these exercises when you can stand comfortably on your injured leg with your heel resting on the floor and your full weight evenly distributed on both legs.  Towel stretch: Sit on a hard surface with your injured leg stretched out in front of you. Loop a towel around your toes and the ball of your foot and pull the towel toward your body keeping your leg straight. Hold this position for 15 to 30 seconds and then relax. Repeat 3 times.  When you don't feel much of a stretch using the towel, you can start the following exercises.  Standing calf stretch: Stand facing a wall with your hands on the wall at about eye level. Keep your injured leg back with your heel on the floor. Keep the other leg forward with the knee bent. Turn your back foot slightly inward (as if you were pigeon-toed). Slowly lean into the wall until you feel a stretch in the back of your calf. Hold the stretch for 15 to 30 seconds. Return to the starting position. Repeat 3 times. Do this exercise several times each day.   Standing soleus stretch: Stand facing a wall with your hands on the wall at about chest height. Keep your injured leg back with your heel on the floor. Keep the other leg forward with the knee bent. Turn your back foot slightly inward (as if you were pigeon-toed). Bend your back knee slightly and gently lean into the wall until you feel a stretch in  the lower calf of your injured leg. Hold the stretch for 15 to 30 seconds. Return to the starting position. Repeat 3 times.   Achilles stretch: Stand with the ball of one foot on a stair. Reach for the step below with your heel until you feel a stretch in the arch of your foot. Hold this position for 15 to 30 seconds and then relax. Repeat 3 times.   Heel raise: Stand behind a chair or counter with both feet flat on the floor. Using the chair or counter as a support, rise up onto your toes and hold for 5 seconds. Then slowly lower yourself down without holding onto the support. (It's OK to keep holding onto the support if you need to.) When this exercise becomes less painful, try doing this exercise while you are standing on the injured leg only. Repeat 15 times. Do 2 sets of 15. Rest 30 seconds between sets.   Step-up: Stand with the foot of your injured leg on a support 3 to 5 inches (8 to 13 centimeters) high --like a small step or block of wood. Keep your other foot flat on the floor. Shift your weight onto the injured leg on the support. Straighten your injured leg as the other leg comes off the floor. Return to the starting position by bending your injured leg and slowly lowering your uninjured leg back to the floor. Do 2 sets of 15.   Resisted ankle eversion: Sit with both legs stretched out in front of you, with your feet about a shoulder's width apart. Tie a loop in one end of elastic tubing. Put the foot of your injured leg through the loop so that the tubing goes around the arch of that foot and wraps around the outside of the other foot. Hold onto the other end of the tubing with your hand to provide tension. Turn the foot of your injured leg up and out. Make sure you keep your other foot still so that it will allow the tubing to stretch as you move the foot of your injured leg. Return to the starting position. Do 2 sets of 15.   Balance and reach exercises: Stand next to a chair with your injured leg  farther from the chair. The chair will provide support if you need it. Stand on the foot of your injured leg and bend your knee slightly. Try to raise the arch of this foot while keeping your big toe on the floor. Keep your foot in this position.   With the hand that is farther away from the chair, reach forward in front of you by bending at the waist. Avoid bending your knee any more as you do this. Repeat this 15 times. To make the exercise more challenging, reach farther in front of you. Do 2 sets of 15.   While keeping your arch raised, reach the hand that is farther away from the chair across your body toward the chair. The farther you reach, the more challenging the exercise. Do 2 sets of 15.  If you have access to a wobble board, do the following exercises:  Wobble board exercises   Stand on a wobble board with your feet shoulder-width apart.   Rock the board forwards and backwards 30 times, then side to side 30 times. Hold on to a chair if you need support.   Rotate the wobble board around so that the edge of the board is in contact with the floor at all times. Do this 30 times in a clockwise and then a counterclockwise direction.   Balance on the wobble board for as long as you can without letting the edges touch the floor. Try to do this for 2 minutes without touching the floor.   Rotate the wobble board in clockwise and counterclockwise circles, but do not let the edge of the board touch the floor.   When you have mastered the wobble exercises standing on both legs, try repeating them while standing on just your injured leg. After you are able to do these exercises on one leg, try to do them with your eyes closed. Make sure you have something nearby to support you in case you lose your balance.  Developed by [employer].  Published by [employer].  Copyright  2014 [employer] and/or one of its subsidiaries. All rights reserved.              [employer] - Blue [employer]

## 2025-06-12 NOTE — PROGRESS NOTES
CHIEF COMPLAINT:  Pain of the Left Foot     HISTORY OF PRESENT ILLNESS  Mr. Izaguirre is a pleasant 24 year old year old male who presents to clinic today with left foot pain.  Sudarshan explains that he's had left foot pain since his last visit with 3/27/25 but worsening 2 days of left foot without injury. He states that over time with going to the gym and giong to concerts he's had an increase in his pain. Pain is worse with weightbearing activities. He also mentions that he would like to discuss absence of work. His job involves climbing on roofs.     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  He would not feel safe doing so with current level of function.    Additional history: as documented    Review of Systems:  Have you recently had a a fever, chills, weight loss? No  Do you have any vision problems? No  Do you have any chest pain or edema? No  Do you have any shortness of breath or wheezing?  No  Do you have stomach problems? No  Do you have any numbness or focal weakness? No  Do you have diabetes? No  Do you have problems with bleeding or clotting? No  Do you have an rashes or other skin lesions? No    MEDICAL HISTORY  There is no problem list on file for this patient.      Current Outpatient Medications   Medication Sig Dispense Refill    dexAMETHasone (DECADRON) 4 MG/ML injection Use 4 mg or dose determined by provider for iontophoresis. 5 mL 1    levothyroxine (SYNTHROID/LEVOTHROID) 137 MCG tablet       fluticasone (FLONASE) 50 MCG/ACT nasal spray Spray 1 spray into both  nostrils daily (Patient not taking: Reported on 5/18/2024) 9.9 mL 0       No Known Allergies    No family history on file.    Additional medical/Social/Surgical histories reviewed in EPIC and updated as appropriate.       PHYSICAL EXAM  There were no vitals taken for this visit.    General  - normal appearance, in no obvious distress  Musculoskeletal - left foot  - stance: normal gait without limp, pes planus deformity weightbearing only bilateral  - inspection: no swelling or effusion, normal bone and joint alignment, no obvious deformity  - palpation: tender at the medial calcaneal tubercle into proximal arch.  Tenderness at left achilles tendon.  Tenderness at lateral ankle at peroneal tendons from distal fibula to base of fifth metatarsal. Additional tenderness at fifth MTPJ.  - ROM: normal active and passive ROM of great and lesser toes, no pain with MT translation. Ankle with reduced inversion and eversion.    - strength: 5/5 in all planes except 4-/5 eversion against resistance.  Neuro  - no sensory or motor deficit, grossly normal coordination, normal muscle tone    IMAGING : Deferred today     ASSESSMENT & PLAN  Mr. Izaguirre is a 24 year old year old male who presents to clinic today with acute left lateral sided ankle and foot pain after gym related injury performing squats 2 days ago.  This is in the setting of persisting calcaneal pain at plantar aspect of foot.    Diagnosis:   Plantar fasciitis of left foot  Peroneal tendon strain left foot  Posterior tibial tendon dysfunction bilateral feet    Treatment options discussed and I have recommended starting a home exercise program for peroneal tendon.  He will also continue plantar fascia stretches. I have also added achilles stretching program.  Discussed appropriate footwear and avoiding thin minimally supportive shoes.  Orthotics order placed for bilateral full length orthotics. Ibuprofen or aleve as needed for lateral ankle strain.  Consider ankle support  sleeve or brace.     We reviewed job descriptions and I have recommended 2 weeks away from work. After this time may return to work without restriction.    It was a pleasure seeing Sudarshan today.    Angus Botello DO, AURY    Primary Care Sports Medicine  Department of Orthopedic Surgery  [facility]

## 2025-06-21 ENCOUNTER — HEALTH MAINTENANCE LETTER (OUTPATIENT)
Age: 25
End: 2025-06-21

## 2025-06-27 ENCOUNTER — MYC MEDICAL ADVICE (OUTPATIENT)
Dept: ORTHOPEDICS | Facility: CLINIC | Age: 25
End: 2025-06-27
Payer: COMMERCIAL

## 2025-06-27 DIAGNOSIS — M76.822 POSTERIOR TIBIAL TENDON DYSFUNCTION (PTTD) OF BOTH LOWER EXTREMITIES: Primary | ICD-10-CM

## 2025-06-27 DIAGNOSIS — M76.821 POSTERIOR TIBIAL TENDON DYSFUNCTION (PTTD) OF BOTH LOWER EXTREMITIES: Primary | ICD-10-CM

## 2025-06-30 DIAGNOSIS — M76.821 POSTERIOR TIBIAL TENDON DYSFUNCTION (PTTD) OF BOTH LOWER EXTREMITIES: Primary | ICD-10-CM

## 2025-06-30 DIAGNOSIS — M76.822 POSTERIOR TIBIAL TENDON DYSFUNCTION (PTTD) OF BOTH LOWER EXTREMITIES: Primary | ICD-10-CM

## 2025-06-30 DIAGNOSIS — M25.572 ACUTE LEFT ANKLE PAIN: ICD-10-CM

## 2025-06-30 NOTE — TELEPHONE ENCOUNTER
Patient last seen 6/12/25 for PTTD.     See patient message and advise on response regarding advanced imaging and extension of work restrictions.     Luciana Fernandez, ATC

## 2025-07-10 ENCOUNTER — MYC MEDICAL ADVICE (OUTPATIENT)
Dept: ORTHOPEDICS | Facility: CLINIC | Age: 25
End: 2025-07-10
Payer: COMMERCIAL